# Patient Record
Sex: FEMALE | Race: OTHER | Employment: UNEMPLOYED | URBAN - METROPOLITAN AREA
[De-identification: names, ages, dates, MRNs, and addresses within clinical notes are randomized per-mention and may not be internally consistent; named-entity substitution may affect disease eponyms.]

---

## 2018-10-22 LAB
ANTIBODY SCREEN, EXTERNAL: POSITIVE
HBSAG, EXTERNAL: NEGATIVE
HIV, EXTERNAL: NEGATIVE
RPR, EXTERNAL: NORMAL
RUBELLA, EXTERNAL: NORMAL
TYPE, ABO & RH, EXTERNAL: NORMAL

## 2019-01-25 LAB — ANTIBODY SCREEN, EXTERNAL: POSITIVE

## 2019-02-27 ENCOUNTER — TELEPHONE (OUTPATIENT)
Dept: OBGYN CLINIC | Age: 24
End: 2019-02-27

## 2019-02-27 NOTE — TELEPHONE ENCOUNTER
Spoke with patient's . Has appointment tmrw at 1:40 pm.  She is a transfer pt from [facility] and we have not received records. Encouraged patient's  to please obtain records asap. He verbalized understanding.

## 2019-03-22 LAB — GRBS, EXTERNAL: POSITIVE

## 2019-04-06 ENCOUNTER — HOSPITAL ENCOUNTER (INPATIENT)
Age: 24
LOS: 3 days | Discharge: HOME | DRG: 560 | End: 2019-04-09
Attending: OBSTETRICS & GYNECOLOGY | Admitting: OBSTETRICS & GYNECOLOGY
Payer: COMMERCIAL

## 2019-04-06 DIAGNOSIS — R52 POSTPARTUM PAIN: Primary | ICD-10-CM

## 2019-04-06 PROBLEM — Z37.9 NORMAL LABOR: Status: ACTIVE | Noted: 2019-04-06

## 2019-04-06 PROCEDURE — 99218 HC RM OBSERVATION: CPT

## 2019-04-06 PROCEDURE — 99285 EMERGENCY DEPT VISIT HI MDM: CPT

## 2019-04-06 PROCEDURE — 65270000029 HC RM PRIVATE

## 2019-04-06 RX ORDER — FAMOTIDINE 10 MG/1
10 TABLET ORAL 2 TIMES DAILY
COMMUNITY
End: 2019-04-09

## 2019-04-06 RX ORDER — FENTANYL CITRATE 50 UG/ML
100 INJECTION, SOLUTION INTRAMUSCULAR; INTRAVENOUS
Status: DISCONTINUED | OUTPATIENT
Start: 2019-04-06 | End: 2019-04-07 | Stop reason: HOSPADM

## 2019-04-06 RX ORDER — SODIUM CHLORIDE 9 MG/ML
250 INJECTION, SOLUTION INTRAVENOUS AS NEEDED
Status: DISCONTINUED | OUTPATIENT
Start: 2019-04-06 | End: 2019-04-09 | Stop reason: HOSPADM

## 2019-04-06 RX ORDER — CALCIUM CARBONATE 200(500)MG
400 TABLET,CHEWABLE ORAL
Status: DISCONTINUED | OUTPATIENT
Start: 2019-04-06 | End: 2019-04-09 | Stop reason: HOSPADM

## 2019-04-06 RX ORDER — NALBUPHINE HYDROCHLORIDE 10 MG/ML
10 INJECTION, SOLUTION INTRAMUSCULAR; INTRAVENOUS; SUBCUTANEOUS
Status: DISCONTINUED | OUTPATIENT
Start: 2019-04-06 | End: 2019-04-07 | Stop reason: HOSPADM

## 2019-04-06 RX ORDER — TERBUTALINE SULFATE 1 MG/ML
0.25 INJECTION SUBCUTANEOUS AS NEEDED
Status: DISCONTINUED | OUTPATIENT
Start: 2019-04-06 | End: 2019-04-07 | Stop reason: HOSPADM

## 2019-04-06 NOTE — PROGRESS NOTES
1925 - Pt to LDR Rm 4 for complaint of contractions/abdominal pains since about 0100, but closer and more regular (approx every 5-10 min) since 0700. She noted some bloody mucous (vaginal). Accompanied by  (Dolores Mendenhall). 1936 - Bedside and Verbal shift change report given to SARA Loya RNC (oncoming nurse) by Ana Ramos RN (offgoing nurse). Report included the following information SBAR and Kardex. 1946 - Notified VAMSI Mcgarry Riser of pt's arrival and history. 1950 - R. Malgorzata Riser at bedside to assess pt. SVE 1/100/-1 BOW intact. Her plan is to reassess pt between 2300-MN and she encouraged pt to rest, if able, in the meantime. Pt also aware that she may ambulate in halls or use the shower for relaxation. 2351 - R. Malgorzata Riser at bedside to assess pt. SVE 2/100/-1. Pt will be admitted and desires pain medicine (IV). 
 
0610 - SVE 4-5/100/-1/BBOW 
 
2864 - R. Malgorzata Riser updated on recent SVE and SROM at 9324. 
 
9303 - Dr Mimi Sorenson at bedside to place epidural. 
 
2512 - Bedside and Verbal shift change report given to NAOMI Rosales RN (oncoming nurse) by Concepción Landa RNC (offgoing nurse). Report included the following information SBAR, Kardex, Procedure Summary, Intake/Output, MAR, Accordion, Recent Results and Med Rec Status.

## 2019-04-06 NOTE — H&P
Labor and Delivery Admission Note 2019 Patient is a 26 y.o.  at 39w0d dated by [address]. Pt of Dr. Sera Nick who presents with c/o Ucs since last night that became stronger at 0700. Denies sig med/ob Hx. GBS+ PNC: Blood type: B 
          RH: pos Hep B: Negative Rubella: immune GBS status: positive OBHX:  
OB History Mary Ing 1 Para Term  AB Living SAB  
   
 TAB Ectopic Molar Multiple Live Births PMH: No past medical history on file. PSH: No past surgical history on file. OB/GYN: G3 Meds:  
Prior to Admission Medications Prescriptions Last Dose Informant Patient Reported? Taking?  
famotidine (PEPCID) 10 mg tablet 2019 at Unknown time  Yes Yes Sig: Take 10 mg by mouth two (2) times a day. Facility-Administered Medications: None Allergies: No Known Allergies Pertinent ROS: Review of Systems - History obtained from chart review and the patient General ROS: negative Psychological ROS: negative Respiratory ROS: no cough, shortness of breath, or wheezing Cardiovascular ROS: no chest pain or dyspnea on exertion Genito-Urinary ROS: no dysuria, trouble voiding, or hematuria Neurological ROS: negative [address]: No family history on file. SH: Social History Socioeconomic History  Marital status: Not on file Spouse name: Not on file  Number of children: Not on file  Years of education: Not on file  Highest education level: Not on file Occupational History  Not on file Social Needs  Financial resource strain: Not on file  Food insecurity:  
  Worry: Not on file Inability: Not on file  Transportation needs:  
  Medical: Not on file Non-medical: Not on file Tobacco Use  Smoking status: Never Smoker  Smokeless tobacco: Never Used Substance and Sexual Activity  Alcohol use: Never Frequency: Never  Drug use: Not on file  Sexual activity: Not on file Lifestyle  Physical activity:  
  Days per week: Not on file Minutes per session: Not on file  Stress: Not on file Relationships  Social connections:  
  Talks on phone: Not on file Gets together: Not on file Attends Sikh service: Not on file Active member of club or organization: Not on file Attends meetings of clubs or organizations: Not on file Relationship status: Not on file  Intimate partner violence:  
  Fear of current or ex partner: Not on file Emotionally abused: Not on file Physically abused: Not on file Forced sexual activity: Not on file Other Topics Concern [address] Service Not Asked  Blood Transfusions Not Asked  Caffeine Concern Not Asked  Occupational Exposure Not Asked [employer] Hazards Not Asked  Sleep Concern Not Asked  Stress Concern Not Asked  Weight Concern Not Asked  Special Diet Not Asked  Back Care Not Asked  Exercise Not Asked  Bike Helmet Not Asked  Seat Belt Not Asked  Self-Exams Not Asked Social History Narrative  Not on file OBJECTIVE: 
 
Temp (24hrs), Av.5 °F (36.9 °C), Min:98.5 °F (36.9 °C), Max:98.5 °F (36.9 °C) Visit Vitals /76 Pulse 80 Temp 98.5 °F (36.9 °C) Ht 5' 4\" (1.626 m) Wt 69.9 kg (154 lb) BMI 26.43 kg/m² FHR baseline 145 moderate variability, +accels, n o decels [place] 7 min, moderate intensity Exam: 
General: alert HEENT:  normal  
Lungs:  clear Cor:  RRR Abdomen:  Soft, non-tender Clinical EFW Cervix:  1/100/-1 Fluid: intact Pelvimetry:  AP-good Arch- adequate Sidewalls- adequate Pelvis feels adequate for fetus. Extremities:  normal, no edema Impression:  
at 39w0d Reassuring FWB Early labor GBS+ Plan: Monitor for a few hours, discussed options of going home and returning with closer UCs vs staying and reassessing a in a few hours Pt desires to stay and see if she makes cervical change Emely Pickard CNM 
7:58 PM

## 2019-04-07 ENCOUNTER — ANESTHESIA EVENT (OUTPATIENT)
Dept: LABOR AND DELIVERY | Age: 24
DRG: 560 | End: 2019-04-07
Payer: COMMERCIAL

## 2019-04-07 ENCOUNTER — ANESTHESIA (OUTPATIENT)
Dept: LABOR AND DELIVERY | Age: 24
DRG: 560 | End: 2019-04-07
Payer: COMMERCIAL

## 2019-04-07 LAB
ERYTHROCYTE [DISTWIDTH] IN BLOOD BY AUTOMATED COUNT: 12.2 % (ref 11.5–14.5)
HCT VFR BLD AUTO: 36.2 % (ref 35–47)
HGB BLD-MCNC: 11.8 G/DL (ref 11.5–16)
MCH RBC QN AUTO: 28.7 PG (ref 26–34)
MCHC RBC AUTO-ENTMCNC: 32.6 G/DL (ref 30–36.5)
MCV RBC AUTO: 88.1 FL (ref 80–99)
NRBC # BLD: 0 K/UL (ref 0–0.01)
NRBC BLD-RTO: 0 PER 100 WBC
PLATELET # BLD AUTO: 228 K/UL (ref 150–400)
PMV BLD AUTO: 10.4 FL (ref 8.9–12.9)
RBC # BLD AUTO: 4.11 M/UL (ref 3.8–5.2)
WBC # BLD AUTO: 10.5 K/UL (ref 3.6–11)

## 2019-04-07 PROCEDURE — 86870 RBC ANTIBODY IDENTIFICATION: CPT

## 2019-04-07 PROCEDURE — 74011000250 HC RX REV CODE- 250

## 2019-04-07 PROCEDURE — 3E0R3BZ INTRODUCTION OF ANESTHETIC AGENT INTO SPINAL CANAL, PERCUTANEOUS APPROACH: ICD-10-PCS | Performed by: ANESTHESIOLOGY

## 2019-04-07 PROCEDURE — 77030011943

## 2019-04-07 PROCEDURE — 85027 COMPLETE CBC AUTOMATED: CPT

## 2019-04-07 PROCEDURE — 86900 BLOOD TYPING SEROLOGIC ABO: CPT

## 2019-04-07 PROCEDURE — 86921 COMPATIBILITY TEST INCUBATE: CPT

## 2019-04-07 PROCEDURE — 74011000258 HC RX REV CODE- 258: Performed by: ADVANCED PRACTICE MIDWIFE

## 2019-04-07 PROCEDURE — 74011250636 HC RX REV CODE- 250/636: Performed by: ADVANCED PRACTICE MIDWIFE

## 2019-04-07 PROCEDURE — 65410000002 HC RM PRIVATE OB

## 2019-04-07 PROCEDURE — A4300 CATH IMPL VASC ACCESS PORTAL: HCPCS

## 2019-04-07 PROCEDURE — 36415 COLL VENOUS BLD VENIPUNCTURE: CPT

## 2019-04-07 PROCEDURE — 74011250637 HC RX REV CODE- 250/637: Performed by: ADVANCED PRACTICE MIDWIFE

## 2019-04-07 PROCEDURE — 86922 COMPATIBILITY TEST ANTIGLOB: CPT

## 2019-04-07 PROCEDURE — 74011250636 HC RX REV CODE- 250/636

## 2019-04-07 PROCEDURE — 86920 COMPATIBILITY TEST SPIN: CPT

## 2019-04-07 RX ORDER — IBUPROFEN 400 MG/1
800 TABLET ORAL EVERY 8 HOURS
Status: DISCONTINUED | OUTPATIENT
Start: 2019-04-07 | End: 2019-04-09 | Stop reason: HOSPADM

## 2019-04-07 RX ORDER — SIMETHICONE 80 MG
80 TABLET,CHEWABLE ORAL
Status: DISCONTINUED | OUTPATIENT
Start: 2019-04-07 | End: 2019-04-09 | Stop reason: HOSPADM

## 2019-04-07 RX ORDER — ACETAMINOPHEN 325 MG/1
650 TABLET ORAL
Status: DISCONTINUED | OUTPATIENT
Start: 2019-04-07 | End: 2019-04-09 | Stop reason: HOSPADM

## 2019-04-07 RX ORDER — FENTANYL/BUPIVACAINE/NS/PF 2-1250MCG
PREFILLED PUMP RESERVOIR EPIDURAL
Status: COMPLETED
Start: 2019-04-07 | End: 2019-04-07

## 2019-04-07 RX ORDER — BUPIVACAINE HYDROCHLORIDE 5 MG/ML
30 INJECTION, SOLUTION EPIDURAL; INTRACAUDAL AS NEEDED
Status: DISCONTINUED | OUTPATIENT
Start: 2019-04-07 | End: 2019-04-07 | Stop reason: HOSPADM

## 2019-04-07 RX ORDER — NALOXONE HYDROCHLORIDE 0.4 MG/ML
0.4 INJECTION, SOLUTION INTRAMUSCULAR; INTRAVENOUS; SUBCUTANEOUS AS NEEDED
Status: DISCONTINUED | OUTPATIENT
Start: 2019-04-07 | End: 2019-04-07 | Stop reason: SDUPTHER

## 2019-04-07 RX ORDER — NALOXONE HYDROCHLORIDE 0.4 MG/ML
0.4 INJECTION, SOLUTION INTRAMUSCULAR; INTRAVENOUS; SUBCUTANEOUS AS NEEDED
Status: DISCONTINUED | OUTPATIENT
Start: 2019-04-07 | End: 2019-04-09 | Stop reason: HOSPADM

## 2019-04-07 RX ORDER — EPHEDRINE SULFATE/0.9% NACL/PF 50 MG/5 ML
SYRINGE (ML) INTRAVENOUS
Status: DISCONTINUED
Start: 2019-04-07 | End: 2019-04-07 | Stop reason: WASHOUT

## 2019-04-07 RX ORDER — SODIUM CHLORIDE, SODIUM LACTATE, POTASSIUM CHLORIDE, CALCIUM CHLORIDE 600; 310; 30; 20 MG/100ML; MG/100ML; MG/100ML; MG/100ML
125 INJECTION, SOLUTION INTRAVENOUS CONTINUOUS
Status: DISCONTINUED | OUTPATIENT
Start: 2019-04-07 | End: 2019-04-09 | Stop reason: HOSPADM

## 2019-04-07 RX ORDER — HYDROCODONE BITARTRATE AND ACETAMINOPHEN 5; 325 MG/1; MG/1
1 TABLET ORAL
Status: DISCONTINUED | OUTPATIENT
Start: 2019-04-07 | End: 2019-04-09 | Stop reason: HOSPADM

## 2019-04-07 RX ORDER — SODIUM CHLORIDE 0.9 % (FLUSH) 0.9 %
5-40 SYRINGE (ML) INJECTION AS NEEDED
Status: DISCONTINUED | OUTPATIENT
Start: 2019-04-07 | End: 2019-04-09 | Stop reason: HOSPADM

## 2019-04-07 RX ORDER — BUPIVACAINE HYDROCHLORIDE 5 MG/ML
INJECTION, SOLUTION EPIDURAL; INTRACAUDAL
Status: COMPLETED
Start: 2019-04-07 | End: 2019-04-07

## 2019-04-07 RX ORDER — OXYTOCIN/RINGER'S LACTATE 20/1000 ML
125-500 PLASTIC BAG, INJECTION (ML) INTRAVENOUS ONCE
Status: ACTIVE | OUTPATIENT
Start: 2019-04-07 | End: 2019-04-08

## 2019-04-07 RX ORDER — FENTANYL CITRATE 50 UG/ML
100 INJECTION, SOLUTION INTRAMUSCULAR; INTRAVENOUS ONCE
Status: COMPLETED | OUTPATIENT
Start: 2019-04-07 | End: 2019-04-07

## 2019-04-07 RX ORDER — OXYTOCIN/0.9 % SODIUM CHLORIDE 30/500 ML
PLASTIC BAG, INJECTION (ML) INTRAVENOUS
Status: COMPLETED
Start: 2019-04-07 | End: 2019-04-07

## 2019-04-07 RX ORDER — BUPIVACAINE HYDROCHLORIDE 5 MG/ML
INJECTION, SOLUTION EPIDURAL; INTRACAUDAL
Status: COMPLETED | OUTPATIENT
Start: 2019-04-07 | End: 2019-04-07

## 2019-04-07 RX ORDER — FENTANYL/BUPIVACAINE/NS/PF 2-1250MCG
1-16 PREFILLED PUMP RESERVOIR EPIDURAL CONTINUOUS
Status: DISCONTINUED | OUTPATIENT
Start: 2019-04-07 | End: 2019-04-09 | Stop reason: HOSPADM

## 2019-04-07 RX ORDER — FAMOTIDINE 20 MG/1
10 TABLET, FILM COATED ORAL 2 TIMES DAILY
Status: DISCONTINUED | OUTPATIENT
Start: 2019-04-07 | End: 2019-04-09 | Stop reason: HOSPADM

## 2019-04-07 RX ORDER — CEFAZOLIN SODIUM/WATER 2 G/20 ML
2 SYRINGE (ML) INTRAVENOUS ONCE
Status: COMPLETED | OUTPATIENT
Start: 2019-04-07 | End: 2019-04-07

## 2019-04-07 RX ORDER — FENTANYL CITRATE 50 UG/ML
INJECTION, SOLUTION INTRAMUSCULAR; INTRAVENOUS
Status: COMPLETED
Start: 2019-04-07 | End: 2019-04-07

## 2019-04-07 RX ORDER — HYDROCORTISONE ACETATE PRAMOXINE HCL 2.5; 1 G/100G; G/100G
CREAM TOPICAL AS NEEDED
Status: DISCONTINUED | OUTPATIENT
Start: 2019-04-07 | End: 2019-04-09 | Stop reason: HOSPADM

## 2019-04-07 RX ORDER — FENTANYL CITRATE 50 UG/ML
INJECTION, SOLUTION INTRAMUSCULAR; INTRAVENOUS AS NEEDED
Status: DISCONTINUED | OUTPATIENT
Start: 2019-04-07 | End: 2019-04-07 | Stop reason: HOSPADM

## 2019-04-07 RX ADMIN — AMPICILLIN SODIUM 2 G: 2 INJECTION, POWDER, FOR SOLUTION INTRAMUSCULAR; INTRAVENOUS at 04:33

## 2019-04-07 RX ADMIN — FENTANYL CITRATE 100 MCG: 50 INJECTION, SOLUTION INTRAMUSCULAR; INTRAVENOUS at 06:57

## 2019-04-07 RX ADMIN — OXYTOCIN-SODIUM CHLORIDE 0.9% IV SOLN 30 UNIT/500ML 30000 MILLI-UNITS: 30-0.9/5 SOLUTION at 12:50

## 2019-04-07 RX ADMIN — PROMETHAZINE HYDROCHLORIDE 25 MG: 25 INJECTION INTRAMUSCULAR; INTRAVENOUS at 00:41

## 2019-04-07 RX ADMIN — Medication 10 ML/HR: at 07:04

## 2019-04-07 RX ADMIN — SODIUM CHLORIDE, SODIUM LACTATE, POTASSIUM CHLORIDE, AND CALCIUM CHLORIDE 125 ML/HR: 600; 310; 30; 20 INJECTION, SOLUTION INTRAVENOUS at 09:07

## 2019-04-07 RX ADMIN — NALBUPHINE HYDROCHLORIDE 10 MG: 10 INJECTION, SOLUTION INTRAMUSCULAR; INTRAVENOUS; SUBCUTANEOUS at 04:21

## 2019-04-07 RX ADMIN — SODIUM CHLORIDE, SODIUM LACTATE, POTASSIUM CHLORIDE, AND CALCIUM CHLORIDE 125 ML/HR: 600; 310; 30; 20 INJECTION, SOLUTION INTRAVENOUS at 06:40

## 2019-04-07 RX ADMIN — BUPIVACAINE HYDROCHLORIDE 5 MG: 5 INJECTION, SOLUTION EPIDURAL; INTRACAUDAL at 06:57

## 2019-04-07 RX ADMIN — FENTANYL CITRATE 100 MCG: 50 INJECTION, SOLUTION INTRAMUSCULAR; INTRAVENOUS at 06:59

## 2019-04-07 RX ADMIN — NALBUPHINE HYDROCHLORIDE 10 MG: 10 INJECTION, SOLUTION INTRAMUSCULAR; INTRAVENOUS; SUBCUTANEOUS at 00:43

## 2019-04-07 RX ADMIN — CALCIUM CARBONATE (ANTACID) CHEW TAB 500 MG 400 MG: 500 CHEW TAB at 04:33

## 2019-04-07 RX ADMIN — Medication 2 G: at 13:42

## 2019-04-07 RX ADMIN — SODIUM CHLORIDE, SODIUM LACTATE, POTASSIUM CHLORIDE, AND CALCIUM CHLORIDE 1000 ML: 600; 310; 30; 20 INJECTION, SOLUTION INTRAVENOUS at 06:10

## 2019-04-07 RX ADMIN — SODIUM CHLORIDE, SODIUM LACTATE, POTASSIUM CHLORIDE, AND CALCIUM CHLORIDE 125 ML/HR: 600; 310; 30; 20 INJECTION, SOLUTION INTRAVENOUS at 00:30

## 2019-04-07 RX ADMIN — AMPICILLIN 1 G: 1 INJECTION, POWDER, FOR SOLUTION INTRAMUSCULAR; INTRAVENOUS at 09:04

## 2019-04-07 RX ADMIN — ACETAMINOPHEN 650 MG: 325 TABLET ORAL at 21:59

## 2019-04-07 RX ADMIN — IBUPROFEN 800 MG: 400 TABLET ORAL at 15:51

## 2019-04-07 NOTE — ANESTHESIA POSTPROCEDURE EVALUATION
Post-Anesthesia Evaluation and Assessment Patient: Renu Ronquillo MRN: [id number]  SSN: [id number] YOB: 1995  Age: 26 y.o. Sex: female I have evaluated the patient and they are stable and ready for discharge from the PACU. Cardiovascular Function/Vital Signs Visit Vitals /77 Pulse 86 Temp 37.7 °C (99.8 °F) Resp 16 Ht 5' 4\" (1.626 m) Wt 69.9 kg (154 lb) SpO2 97% Breastfeeding? Unknown BMI 26.43 kg/m² Patient is status post * No anesthesia type entered * anesthesia for * No procedures listed *. Nausea/Vomiting: None Postoperative hydration reviewed and adequate. Pain: 
Pain Scale 1: Numeric (0 - 10) (04/07/19 1313) Pain Intensity 1: 0 (04/07/19 1313) Managed Neurological Status:  
Neuro (WDL): Within Defined Limits (04/07/19 0801) At baseline Mental Status, Level of Consciousness: Alert and  oriented to person, place, and time Pulmonary Status:  
O2 Device: Room air (04/07/19 0801) Adequate oxygenation and airway patent Complications related to anesthesia: None Post-anesthesia assessment completed. No concerns Signed By: Sharif Salamanca MD   
 April 7, 2019 * No procedures listed *. 
 
epidural 
 
<BSHSIANPOST> Vitals Value Taken Time /60 4/7/2019  2:38 PM  
Temp Pulse 99 4/7/2019  2:38 PM  
Resp SpO2 Vitals shown include unvalidated device data.

## 2019-04-07 NOTE — ANESTHESIA PREPROCEDURE EVALUATION
Relevant Problems No relevant active problems Anesthetic History No history of anesthetic complications Review of Systems / Medical History Patient summary reviewed, nursing notes reviewed and pertinent labs reviewed Pulmonary Within defined limits Neuro/Psych Within defined limits Cardiovascular Within defined limits GI/Hepatic/Renal 
Within defined limits Endo/Other Within defined limits Other Findings Physical Exam 
 
Airway Mallampati: II 
TM Distance: > 6 cm Neck ROM: normal range of motion Mouth opening: Normal 
 
 Cardiovascular Regular rate and rhythm,  S1 and S2 normal,  no murmur, click, rub, or gallop Dental 
No notable dental hx Pulmonary Breath sounds clear to auscultation Abdominal 
GI exam deferred Other Findings Anesthetic Plan ASA: 2 Anesthesia type: epidural 
 
 
 
 
 
Anesthetic plan and risks discussed with: Patient

## 2019-04-07 NOTE — PROGRESS NOTES
0730 Bedside and Verbal shift change report given to Pb Tellez RN (oncoming nurse) by Duran Oreilly RN (offgoing nurse). Report included the following information SBAR, Procedure Summary, Accordion and Recent Results. [address] for 500 cc. 
 
0900 Bard Gera HAMMER on call for [facility] in to see pt. Discuss POC. 
 
1147 Kelby Mar at bedside. SVE 10/100/+3. 
 
1200 Up in [facility], pushing with ucs. Kelby Mar in attendance. 18  of live male baby with short cord. 1249 Manual removal of placenta by Kelby Mar. 1300 St.Cath by Kelby Mar for 600cc. Estimated -500  Ml. 
 
1315 PP care started. 1342 Ancef 2 gm IVPB x 1 per order 1700 [facility] for 900 cc.  
 
1600 Up to w/c for transfer to [facility]. NB in [facility]. Accompanied by Sally Freire, nursing student Shaun Campuzano & spouse Claudeb. 1630 TRANSFER - OUT REPORT: 
 
Verbal report given to Nessa Eisenberg RN(name) on Jaclyn Anton  being transferred to [facility](unit) for routine progression of care Report consisted of patients Situation, Background, Assessment and  
Recommendations(SBAR). Information from the following report(s) SBAR, Procedure Summary, Accordion and Recent Results was reviewed with the receiving nurse. Lines:  
Peripheral IV 19 Anterior; Left Forearm (Active) Site Assessment Clean, dry, & intact 2019 12:44 AM  
Phlebitis Assessment 0 2019 12:44 AM  
Infiltration Assessment 0 2019 12:44 AM  
Dressing Status Clean, dry, & intact; New 2019 12:44 AM  
Dressing Type Tape;Transparent 2019 12:44 AM  
Hub Color/Line Status Pink; Infusing;Patent 2019 12:44 AM  
  
 
Opportunity for questions and clarification was provided.    
 
Patient transported with: 
 Registered Nurse 
 
[phone] with Kelby Mar, not need for CBC in am.

## 2019-04-07 NOTE — PROGRESS NOTES
[facility] Labor Progress Note Patient: Judy Stoner MRN: [id number]  SSN: [id number] YOB: 1995  Age: 26 y.o. Sex: female Care assumed Subjective:  
Patient coping well with contractions. Is now very comfortable with epidural.  
 
 
Objective:  
Blood pressure 119/67, pulse 85, temperature 98.8 °F (37.1 °C), resp. rate 12, height 5' 4\" (1.626 m), weight 69.9 kg (154 lb), not currently breastfeeding. Fetal heart baseline 125, moderate variability, positive accelerations,  negative decelerations, Uterine contractions q 5-6 minutes,  moderate to palpation, resting tone soft, Sterile Vaginal Exam: deferred, fetal presentation vertex, membranes ruptured for continued clear fluid. Patient Vitals for the past 4 hrs: 
 Pulse BP  
19 0714 85 119/67  
19 0706 87 124/70  
19 0659 82 136/73  
19 0656 84 140/71  
19 0654 79 149/80  
19 0633 78 130/85 Assessment:  
 
39w1d Category 1 fetal heart rate tracing Labor GBS Positive Rubella Immune HIV Negative Hep B Negative +Anti-chata b antibodies- 2 units on hold Plan:  
Recheck cervix approx 1200 ( approx 6 hours post last check) to ensure progress. If contractions remain spaced and/or no cervical change consider pitocin augmentation. Recheck cervix sooner with maternal or fetal indication. Anticipate BATSHEVA Mason CNM

## 2019-04-07 NOTE — PROGRESS NOTES
Labor Progress Note Patient seen, fetal heart rate and contraction pattern evaluated. Requesting epidural. FOB at bedside. Physical Exam: 
Cervical Exam: 5/100/-1 per RN Membranes:  SROM, clear fluid at 4298 Uterine Activity: Frequency: 2-5 min Fetal Heart Rate: Reactive, 130 moderate variability Accelerations: yes Decelerations: none Uterine contractions: regular Assessment/Plan: 
IUP 39.1 Reassuring FWB 
GBS+, ampicillin +Anti-chata b antibodies- 2 units on hold 
epidural 
Anticipate  Abdi Samson CNM

## 2019-04-07 NOTE — LACTATION NOTE
This note was copied from a baby's chart. Baby nursing well after delivery, deep latch obtained, mother is comfortable, baby feeding vigorously with rhythmic suck, swallow, breathe pattern,  baby is skin to skin for feeding.

## 2019-04-07 NOTE — L&D DELIVERY NOTE
CNM Delivery Note       Patient: Chasity Valerio MRN: [id number]  SSN: [id number]    YOB: 1995  Age: 26 y.o. Sex: female         of a live male infant at 18 with Apgars 8, 9 in YENNI position undr epidural anesthesia with mother in stirrups position. Shoulders spontaneous, easily delivered with maternal effort. Vigorous infant placed on maternal abdomen immediately following delivery. Placenta and membranes removed manually, trailing membranes teased out with ring forceps,  intact, with 3 vessel cord at 1249. Fundus massaged to firm. Estimated blood loss 500 mL. Vagina and perineum inspected. Perineum intact. Mother and infant stable, bonding, establishing breastfeeding. Delivery Summary    Patient: Chasity Valerio MRN: [id number]  SSN: [id number]    YOB: 1995  Age: 26 y.o. Sex: female       Information for the patient's :  Klever Oneill [[id number]]       Labor Events:    Labor: No   Rupture Date: 2019   Rupture Time: 6:23 AM   Rupture Type Bulging;SROM   Amniotic Fluid Volume:      Amniotic Fluid Description: Clear None   Induction: None       Augmentation: None   Labor Events: None     Cervical Ripening:     None     Delivery Events:  Episiotomy: None   Laceration(s): None     Repaired: None    Number of Repair Packets:     Suture Type and Size:       Estimated Blood Loss (ml):  ml       Delivery Date: 2019    Delivery Time: 12:42 PM  Delivery Type: Vaginal, Spontaneous  Sex:  Male     Gestational Age: 36w3d   Delivery Clinician:  Angela Oakes  Living Status: Living   Delivery Location: L&D            APGARS  One minute Five minutes Ten minutes   Skin color: 1   1        Heart rate: 2   2        Grimace: 2   2        Muscle tone: 1   2        Breathin   2        Totals: 8   9            Presentation: Vertex    Position: Left Occiput Anterior  Resuscitation Method:  Suctioning-bulb; Tactile Stimulation     Meconium Stained: None      Cord Information: 3 Vessels  Complications: None  Cord around:    Delayed cord clamping? Yes  Cord clamped date/time:   Disposition of Cord Blood:      Blood Gases Sent?: No    Placenta:  Date/Time: 2019 12:49 PM  Removal: Manual Removal      Appearance: Intact; Normal;Other (comment)     [place] Measurements:  Birth Weight:        Birth Length:        Head Circumference:        Chest Circumference:       Abdominal Girth: Other Providers:   Delta Tomas, Obstetrician;Primary Nurse;Primary [place] Nurse;Nicu Nurse;Neonatologist;Anesthesiologist;Crna;Nurse Practitioner;Midwife;Nursery Nurse           Group B Strep:   Lab Results   Component Value Date/Time    GrBStrep, External POSITIVE 2019     Information for the patient's :  Erich Valladares [[id number]]   No results found for: ABORH, PCTABR, PCTDIG, BILI, ABORHEXT, ABORH    No results for input(s): PCO2CB, PO2CB, HCO3I, SO2I, IBD, PTEMPI, SPECTI, PHICB, ISITE, IDEV, IALLEN in the last 72 hours.

## 2019-04-07 NOTE — ROUTINE PROCESS
TRANSFER - IN REPORT: 
 
Verbal report received from Hilario Encarnacion RN(name) on Lindaann Cooks  being received from L&D(unit) for routine progression of care Report consisted of patients Situation, Background, Assessment and  
Recommendations(SBAR). Information from the following report(s) SBAR was reviewed with the receiving nurse. Opportunity for questions and clarification was provided. Assessment completed upon patients arrival to unit and care assumed.

## 2019-04-07 NOTE — ANESTHESIA PROCEDURE NOTES
Epidural Block    Start time: 4/7/2019 6:47 AM  End time: 4/7/2019 6:58 AM  Performed by: Rufino Walker MD  Authorized by: Rufino Walker MD     Pre-Procedure  Indication: labor epidural    Preanesthetic Checklist: risks and benefits discussed and timeout performed    Timeout Time: 06:47        Epidural:   Patient position:  Left lateral decubitus  Prep region:  Lumbar  Prep: Chlorhexidine    Location:  L2-3    Needle and Epidural Catheter:   Needle Type:  Tuohy  Needle Gauge:  17 G  Injection Technique:  Loss of resistance using air  Attempts:  1  Catheter Size:  19 G  Events: no blood with aspiration, no cerebrospinal fluid with aspiration, no paresthesia and negative aspiration test    Test Dose:  Bupivacaine 0.25% and negative    Assessment:   Catheter Secured:  Tegaderm and tape  Insertion:  Uncomplicated  Patient tolerance:  Patient tolerated the procedure well with no immediate complications

## 2019-04-07 NOTE — PROGRESS NOTES
Labor Progress Note Patient seen, fetal heart rate and contraction pattern evaluated. C/o increased intensity with UCs. Requesting pain meds. Physical Exam: 
Cervical Exam: 2/100/-1 Membranes:  Intact Uterine Activity: Frequency: 4-6 min Fetal Heart Rate: Reactive, 150 moderate varaibility Accelerations: yes Decelerations: none Uterine contractions: regular Assessment/Plan: 
IUP 39 wks Reassuring FWB 
GBS+- ampicillin 
+anti-chata B antibodies- type and cross on hold for 2 units Early labor IV pain meds as desired Annamarie Iverson CNM

## 2019-04-08 PROCEDURE — 75410000002 HC LABOR FEE PER 1 HR

## 2019-04-08 PROCEDURE — 76060000078 HC EPIDURAL ANESTHESIA

## 2019-04-08 PROCEDURE — 65410000002 HC RM PRIVATE OB

## 2019-04-08 PROCEDURE — 75410000000 HC DELIVERY VAGINAL/SINGLE

## 2019-04-08 PROCEDURE — 74011250637 HC RX REV CODE- 250/637: Performed by: ADVANCED PRACTICE MIDWIFE

## 2019-04-08 PROCEDURE — 75410000003 HC RECOV DEL/VAG/CSECN EA 0.5 HR

## 2019-04-08 RX ADMIN — ACETAMINOPHEN 650 MG: 325 TABLET ORAL at 12:41

## 2019-04-08 RX ADMIN — IBUPROFEN 800 MG: 400 TABLET ORAL at 17:03

## 2019-04-08 RX ADMIN — IBUPROFEN 800 MG: 400 TABLET ORAL at 00:25

## 2019-04-08 RX ADMIN — FAMOTIDINE 10 MG: 20 TABLET ORAL at 08:48

## 2019-04-08 RX ADMIN — ACETAMINOPHEN 650 MG: 325 TABLET ORAL at 08:47

## 2019-04-08 RX ADMIN — IBUPROFEN 800 MG: 400 TABLET ORAL at 08:47

## 2019-04-08 RX ADMIN — ACETAMINOPHEN 650 MG: 325 TABLET ORAL at 21:00

## 2019-04-08 NOTE — PROGRESS NOTES
Bedside and Verbal shift change report given to Blossom Natarajan rn (oncoming nurse) by CHRISTIAN Subramanian rn (offgoing nurse). Report included the following information SBAR.

## 2019-04-08 NOTE — PROGRESS NOTES
Progress Note Patient: Alan Biggs MRN: [id number]  SSN: [id number] YOB: 1995  Age: 26 y.o. Sex: female Postpartum Day Number 1 Subjective:  
 
Patient doing well postpartum without significant complaints. Voiding without difficulty. Patient reports normal lochia. . Breastfeeding: Yes Objective:  
 
Patient Vitals for the past 18 hrs: 
 Temp Pulse Resp BP  
19 0500 97.5 °F (36.4 °C) 73 16 104/70  
19 2100 97.9 °F (36.6 °C) 69 16 105/70  
19 1745 98.4 °F (36.9 °C) 83 16 105/66  
19 1638 98.2 °F (36.8 °C) 86 16 110/72  
19 1544 97.1 °F (36.2 °C) 95 16 118/59  
19 1508  91  119/62  
19 1453  98  117/68  
19 1438  99  113/60  
19 1423  96  107/61  
19 1408  86  119/69  
19 1353  86  119/77  
19 1338  92  121/82 Temp (24hrs), Av.7 °F (37.1 °C), Min:97.1 °F (36.2 °C), Max:102.3 °F (39.1 °C) Physical Exam:   
Patient without distress. Heart: Regular rate and rhythm Lung: clear to auscultation throughout lung fields, no wheezes, no rales, no rhonchi and normal respiratory effort Abdomen: soft, nontender Fundus: firm and non tender Lower Extremities: no calf tenderness Lab/Data Review: CBC:   
Recent Labs 19 
0050 WBC 10.5 HGB 11.8 HCT 36.2  Assessment and Plan:  
 
Patient appears to be having an uncomplicated postpartum course. Continue routine perineal care and maternal education. Boy- desires circ RI/B+ Signed By: David Maxwell MD   
 2019

## 2019-04-08 NOTE — ROUTINE PROCESS
Bedside and Verbal shift change report given to CHRISTIAN Arevalo (oncoming nurse) by Ghulam Treviño RN (offgoing nurse).  Report included the following information SBAR

## 2019-04-08 NOTE — LACTATION NOTE
This note was copied from a baby's chart. I did not see the baby at the breast. Mom states the baby has been nursing about every 2 hours for 20 minutes. She is able to get the baby latched on both breasts. She states she has no questions for lactation at this time.

## 2019-04-09 VITALS
HEIGHT: 64 IN | OXYGEN SATURATION: 97 % | TEMPERATURE: 97.7 F | SYSTOLIC BLOOD PRESSURE: 134 MMHG | RESPIRATION RATE: 16 BRPM | DIASTOLIC BLOOD PRESSURE: 80 MMHG | BODY MASS INDEX: 26.29 KG/M2 | HEART RATE: 82 BPM | WEIGHT: 154 LBS

## 2019-04-09 PROCEDURE — 77030036554

## 2019-04-09 PROCEDURE — 74011250637 HC RX REV CODE- 250/637: Performed by: ADVANCED PRACTICE MIDWIFE

## 2019-04-09 RX ORDER — IBUPROFEN 800 MG/1
800 TABLET ORAL EVERY 8 HOURS
Qty: 30 TAB | Refills: 0 | Status: ON HOLD | OUTPATIENT
Start: 2019-04-09 | End: 2021-11-18 | Stop reason: CLARIF

## 2019-04-09 RX ORDER — HYDROCODONE BITARTRATE AND ACETAMINOPHEN 5; 325 MG/1; MG/1
1 TABLET ORAL
Qty: 3 TAB | Refills: 0 | Status: SHIPPED | OUTPATIENT
Start: 2019-04-09 | End: 2019-04-12

## 2019-04-09 RX ADMIN — IBUPROFEN 800 MG: 400 TABLET ORAL at 01:05

## 2019-04-09 RX ADMIN — CALCIUM CARBONATE (ANTACID) CHEW TAB 500 MG 400 MG: 500 CHEW TAB at 08:40

## 2019-04-09 RX ADMIN — IBUPROFEN 800 MG: 400 TABLET ORAL at 08:40

## 2019-04-09 RX ADMIN — HYDROCODONE BITARTRATE AND ACETAMINOPHEN 1 TABLET: 5; 325 TABLET ORAL at 01:13

## 2019-04-09 NOTE — ROUTINE PROCESS
Bedside shift change report given to Nik Guzman RN (oncoming nurse) by Hema Segovia RN (offgoing nurse). Report included the following information SBAR, Kardex, Procedure Summary, Intake/Output, MAR and Recent Results.

## 2019-04-09 NOTE — LACTATION NOTE
This note was copied from a baby's chart. Infant seen at breast at this visit. Deep latch noted, rhythmic sucking and swallowing noted. Mom has cracks at the base of the left nipple. Order requested from Dr. Isabel Cuevas for nipple treatment. Infant voiding and stooling adequately. Breasts may become engorged when milk \"comes in\". How milk is made / normal phases of milk production, supply and demand discussed. Taught care of engorged breasts - frequent breastfeeding encouraged, warm compresses and breast massage ac. Then nurse the baby or pump. Apply cold compresses pc x 15 minutes a few times a day for swelling or discomfort. May need to do this care for a couple of days. Discussed prevention and treatment of mastitis.

## 2019-04-09 NOTE — PROGRESS NOTES
Post-Partum Day Number 2 Progress/Discharge Note Patient doing well post-partum without significant complaint. Voiding without difficulty, normal lochia. Denies cp/sob/n/v. Ambulating without problem. Vitals:   
Patient Vitals for the past 8 hrs: 
 BP Temp Pulse Resp  
19 0120 105/64 97.8 °F (36.6 °C) 74 16 Temp (24hrs), Av.5 °F (36.4 °C), Min:97.4 °F (36.3 °C), Max:97.8 °F (36.6 °C) Vital signs stable, afebrile. Exam:  Patient without distress. Heart regular rate and rhythm Lungs CTA b/l Abdomen soft, fundus firm at level of umbilicus, non tender Lower extremities are negative for swelling, cords or tenderness. Lab/Data Review: 
no new labs Assessment and Plan:  Patient appears to be having uncomplicated post-partum course. Continue routine perineal care and maternal education. Plan discharge for today with follow up in our office in 2 weeks. Boy-circ today. Benign labs.

## 2019-04-09 NOTE — PROGRESS NOTES
Bedside shift change report given to Radha Sherwood Rn  (oncoming nurse) by Jay Jay Villavicencio  (offgoing nurse). Report included the following information SBAR.

## 2019-04-09 NOTE — DISCHARGE SUMMARY
Obstetrical Discharge Summary     Name: Marie Coon MRN: [id number]  SSN: [id number]    YOB: 1995  Age: 26 y.o. Sex: female      Allergies: Patient has no known allergies. Admit Date: 2019    Discharge Date: 2019     Admitting Physician: Chase Webb MD     Attending Physician:  Anahi Larsen MD     * Admission Diagnoses: Normal labor [O80, Z37.9]  Normal labor [O80, Z37.9]    * Discharge Diagnoses:   Information for the patient's :  Michelle Barrientos [[id number]]   Delivery of a 3.195 kg male infant via Vaginal, Spontaneous on 2019 at 12:42 PM  by . Apgars were 8 and 9. Additional Diagnoses:   Hospital Problems as of 2019 Never Reviewed          Codes Class Noted - Resolved POA    Normal labor ICD-10-CM: O80, Z37.9  ICD-9-CM: 265  2019 - Present Unknown             Lab Results   Component Value Date/Time    ABO/Rh(D) B POSITIVE 2019 12:50 AM    Rubella, External IMMUNE 10/22/2018    GrBStrep, External POSITIVE 2019    ABO,Rh B POS 10/22/2018      There is no immunization history on file for this patient.     * Procedures:  boy 19  * No surgery found *      [place]  Depression Scale  I have been able to laugh and see the funny side of things: As much as I always could  I have looked forward with enjoyment to things: Rather less than I used to  I have blamed myself unnecessarily when things went wrong: No, never  I have been anxious or worried for no good reason: Hardly ever  I have felt scared or panicky for no very good reason: No, not much  Things have been getting on top of me: No, most of the time I have coped quite well  I have been so unhappy that I have had difficulty sleeping: Yes, sometimes  I have felt sad or miserable: Not very often  I have been so unhappy that I have been crying: Yes, quite often  The thought of harming myself has occurred to me: Never  Total Score: 9    * Discharge Condition: [place] [facility] Course: Normal hospital course following the delivery. * Disposition: Home    Discharge Medications:   Current Discharge Medication List      START taking these medications    Details   HYDROcodone-acetaminophen (NORCO) 5-325 mg per tablet Take 1 Tab by mouth every six (6) hours as needed for Pain for up to 3 days. Max Daily Amount: 4 Tabs. Qty: 3 Tab, Refills: 0    Associated Diagnoses: Postpartum pain      ibuprofen (MOTRIN) 800 mg tablet Take 1 Tab by mouth every eight (8) hours. Qty: 30 Tab, Refills: 0    Associated Diagnoses: Postpartum pain         STOP taking these medications       famotidine (PEPCID) 10 mg tablet Comments:   Reason for Stopping:               * Follow-up Care/Patient Instructions:   Activity: no sex, douching, tampons, bath/pool x 6 weeks  Diet: Regular Diet  Wound Care: None needed    Follow-up Information     Follow up With Specialties Details Why Contact Info    Postpartum Support Group    Meets 1st And 3rd Saturday Each Month From 9:00-10:30  [id number]  ([address])  [place] [id number]      Breastfeeding Support Group Lactation Services   Meets 1st And 3rd Tuesday Each Month From 10:00-11:00  [id number]  ([address])  [place] [id number]    None    None (395) Patient stated that they have no PCP      Aubrey Owens MD Obstetrics & Gynecology In 2 weeks  [address] [facility] [address] [place] [address]  [phone]             Signed By:  Raffi Tinajero DO     April 9, 2019

## 2019-04-09 NOTE — ROUTINE PROCESS
Bedside and Verbal shift change report given to Siomara Kapadia (oncoming nurse) by Karl Romero (offgoing nurse). Report included the following information SBAR.

## 2019-04-09 NOTE — DISCHARGE INSTRUCTIONS
POSTPARTUM DISCHARGE INSTRUCTIONS       Name:  Martir Del Valle  YOB: 1995  Admission Diagnosis:  Normal labor [O80, Z37.9]  Normal labor [O80, Z37.9]     Discharge Diagnosis:    Problem List as of 4/9/2019 Never Reviewed          Codes Class Noted - Resolved    Normal labor ICD-10-CM: O80, Z37.9  ICD-9-CM: 481  4/6/2019 - Present            Attending Physician:  Darian Potts MD    Delivery Type:  Vaginal Childbirth: What To Expect At Home    Your Recovery: Your body will slowly heal in the next few weeks. It is easy to get too tired and overwhelmed during the first weeks after your baby is born. Changes in your hormones can shift your mood without warning. You may find it hard to meet the extra demands on your energy and time. Take it easy on yourself. Follow-up care is a key part of your treatment and safety. Be sure to make and go to all appointments, and call your doctor if you are having problems. It's also a good idea to know your test results and keep a list of the medicines you take. How can you care for yourself at home? Vaginal bleeding and cramps  · After delivery, you will have a bloody discharge from the vagina. This will turn pink within a week and then white or yellow after about 10 days. It may last for 2 to 4 weeks or longer, until the uterus has healed. Use pads instead of tampons until you stop bleeding. · Do not worry if you pass some blood clots, as long as they are smaller than a golf ball. If you have a tear or stitches in your vaginal area, change the pad at least every 4 hours to prevent soreness and infection. · You may have cramps for the first few days after childbirth. These are normal and occur as the uterus shrinks to normal size. Take an over-the-counter pain medicine, such as acetaminophen (Tylenol), ibuprofen (Advil, Motrin), or naproxen (Aleve), for cramps. Read and follow all instructions on the label.  Do not take aspirin, because it can cause more bleeding. Do not take acetaminophen (Tylenol) and other acetaminophen containing medications (i.e. Percocet) at the same time. Breast fullness  · Your breasts may overfill (engorge) in the first few days after delivery. To help milk flow and to relieve pain, warm your breasts in the shower or by using warm, moist towels before nursing. · If you are not nursing, do not put warmth on your breasts or touch your breasts. Wear a tight bra or sports bra and use ice until the fullness goes away. This usually takes 2 to 3 days. · Put ice or a cold pack on your breast after nursing to reduce swelling and pain. Put a thin cloth between the ice and your skin. Activity  · Eat a balanced diet. Do not try to lose weight by cutting calories. Keep taking your prenatal vitamins, or take a multivitamin. · Get as much rest as you can. Try to take naps when your baby sleeps during the day. · Get some exercise every day. But do not do any heavy exercise until your doctor says it is okay. · Wait until you are healed (about 4 to 6 weeks) before you have sexual intercourse. Your doctor will tell you when it is okay to have sex. · Talk to your doctor about birth control. You can get pregnant even before your period returns. Also, you can get pregnant while you are breast-feeding. Mental Health  · Many women get the \"baby blues\" during the first few days after childbirth. You may lose sleep, feel irritable, and cry easily. You may feel happy one minute and sad the next. Hormone changes are one cause of these emotional changes. Also, the demands of a new baby, along with visits from relatives or other family needs, add to a mother's stress. The \"baby blues\" often peak around the fourth day. Then they ease up in less than 2 weeks. · If your moodiness or anxiety lasts for more than 2 weeks, or if you feel like life is not worth living, you may have postpartum depression. This is different for each mother.  Some mothers with serious depression may worry intensely about their infant's well-being. Others may feel distant from their child. Some mothers might even feel that they might harm their baby. A mother may have signs of paranoia, wondering if someone is watching her. · With all the changes in your life, you may not know if you are depressed. Pregnancy sometimes causes changes in how you feel that are similar to the symptoms of depression. · Symptoms of depression include:  · Feeling sad or hopeless and losing interest in daily activities. These are the most common symptoms of depression. · Sleeping too much or not enough. · Feeling tired. You may feel as if you have no energy. · Eating too much or too little. · POSTPART[employer] (PSI) offers a Warm line; Chat with the Expert phone sessions; Information and Articles about Pregnancy and Postpartum Mood Disorders; Comprehensive List of Free Support Groups; Knowledgeable local coordinators who will offer support, information, and resources; Guide to Resources on [employer]; Calendar of events in the  mood disorders community; Latest News and Research; and [facility] [address] for [employer]. Remember - You are not alone; You are not to blame; With help, you will be well. 2-830-692-PPD(2068). WWW. POSTPARTUM. NET   · Writing or talking about death, such as writing suicide notes or talking about guns, knives, or pills. Keep the numbers for these national suicide hotlines: 4-016-472-TALK (1-162.480.3509) and 5-423-NORDYDR (8-381.902.5113). If you or someone you know talks about suicide or feeling hopeless, get help right away. Constipation and Hemorrhoids  · Drink plenty of fluids, enough so that your urine is light yellow or clear like water. If you have kidney, heart, or liver disease and have to limit fluids, talk with your doctor before you increase the amount of fluids you drink. · Eat plenty of fiber each day.  Have a bran muffin or bran cereal for breakfast, and try eating a piece of fruit for a mid-afternoon snack. · For painful, itchy hemorrhoids, put ice or a cold pack on the area several times a day for 10 minutes at a time. Follow this by putting a warm compress on the area for another 10 to 20 minutes or by sitting in a shallow, warm bath. When should you call for help? Call 911 anytime you think you may need emergency care. For example, call if:  · You are thinking of hurting yourself, your baby, or anyone else. · You passed out (lost consciousness). · You have symptoms of a blood clot in your lung (called a pulmonary embolism). These may include:    · Sudden chest pain. · Trouble breathing. · Coughing up blood. Call your doctor now or seek immediate medical care if:  · You have severe vaginal bleeding. · You are soaking through a pad each hour for 2 or more hours. · Your vaginal bleeding seems to be getting heavier or is still bright red 4 days after delivery. · You are dizzy or lightheaded, or you feel like you may faint. · You are vomiting or cannot keep fluids down. · You have a fever. · You have new or more belly pain. · You pass tissue (not just blood). · Your vaginal discharge smells bad. · Your belly feels tender or full and hard. · Your breasts are continuously painful or red. · You feel sad, anxious, or hopeless for more than a few days. · You have sudden, severe pain in your belly. · You have symptoms of a blood clot in your leg (called a deep vein thrombosis),          such as:  · Pain in your calf, back of the knee, thigh, or groin. · Redness and swelling in your leg or groin. · You have symptoms of preeclampsia, such as:  · Sudden swelling of your face, hands, or feet. · New vision problems (such as dimness or blurring). · A severe headache. · Your blood pressure is higher than it should be or rises suddenly. · You have new nausea or vomiting.     Watch closely for changes in your health, and be sure to contact your doctor if you have any problems. Additional Information:  Not applicable    These are general instructions for a healthy lifestyle:    No smoking/ No tobacco products/ Avoid exposure to second hand smoke    Surgeon General's Warning:  Quitting smoking now greatly reduces serious risk to your health. Obesity, smoking, and sedentary lifestyle greatly increases your risk for illness    A healthy diet, regular physical exercise & weight monitoring are important for maintaining a healthy lifestyle    Recognize signs and symptoms of STROKE:    F-face looks uneven    A-arms unable to move or move unevenly    S-speech slurred or non-existent    T-time-call 911 as soon as signs and symptoms begin - DO NOT go       back to bed or wait to see if you get better - TIME IS BRAIN. I have had the opportunity to make my options or choices for discharge. I have received and understand these instructions. POSTPARTUM DISCHARGE INSTRUCTIONS       Name:  Jasmin Oh  YOB: 1995  Admission Diagnosis:  Normal labor [O80, Z37.9]  Normal labor [O80, Z37.9]     Discharge Diagnosis:    Problem List as of 4/9/2019 Never Reviewed          Codes Class Noted - Resolved    Normal labor ICD-10-CM: O80, Z37.9  ICD-9-CM: 753  4/6/2019 - Present            Attending Physician:  Oletha Cabot, MD    Delivery Type:  Vaginal Childbirth with Episiotomy, Laceration or Tear: What To Expect At [address] [place] [address] body will slowly heal in the next few weeks. It is easy to get too tired and overwhelmed during the first weeks after your baby is born. Changes in your hormones can shift your mood without warning. You may find it hard to meet the extra demands on your energy and time. Take it easy on yourself. Follow-up care is a key part of your treatment and safety. Be sure to make and go to all appointments, and call your doctor if you are having problems.  It's also a good idea to know your test results and keep a list of the medicines you take. How can you care for yourself at home? Vaginal Bleeding and Cramps  · After delivery, you will have a bloody discharge from the vagina. This will turn pink within a week and then white or yellow after about 10 days. It may last for 2 to 4 weeks or longer, until the uterus has healed. Use pads instead of tampons until you stop bleeding. · Do not worry if you pass some blood clots, as long as they are smaller than a golf ball. If you have a tear or stitches in your vaginal area, change the pad at least every 4 hours to prevent soreness and infection. · You may have cramps for the first few days after childbirth. These are normal and occur as the uterus shrinks to normal size. Take an over-the-counter pain medicine, such as acetaminophen (Tylenol), ibuprofen (Advil, Motrin), or naproxen (Aleve), for cramps. Read and follow all instructions on the label. Do not take aspirin, because it can cause more bleeding. Do not take acetaminophen (Tylenol) and other acetaminophen containing medications (i.e. Percocet) at the same time. Episiotomy, Lacerations or Tears  · If you have stitches, they will dissolve on their own and do not need to be removed. · Put ice or a cold pack on your painful area for 10 to 20 minutes at a time, several times a day, for the first few days. Put a thin cloth between the ice and your skin. · Sit in a few inches of warm water (sitz bath) 3 times a day and after bowel movements. The warm water helps with pain and itching. If you do not have a tub, a warm shower might help. Breast fullness  · Your breasts may overfill (engorge) in the first few days after delivery. To help milk flow and to relieve pain, warm your breasts in the shower or by using warm, moist towels before nursing. · If you are not nursing, do not put warmth on your breasts or touch your breasts. Wear a tight bra or sports bra and use ice until the fullness goes away.  This usually takes 2 to 3 days. · Put ice or a cold pack on your breast after nursing to reduce swelling and pain. Put a thin cloth between the ice and your skin. Activity  · Eat a balanced diet. Do not try to lose weight by cutting calories. Keep taking your prenatal vitamins, or take a multivitamin. · Get as much rest as you can. Try to take naps when your baby sleeps during the day. · Get some exercise every day. But do not do any heavy exercise until your doctor says it is okay. · Wait until you are healed (about 4 to 6 weeks) before you have sexual intercourse. Your doctor will tell you when it is okay to have sex. · Talk to your doctor about birth control. You can get pregnant even before your period returns. Also, you can get pregnant while you are breast-feeding. Mental Health  · Many women get the \"baby blues\" during the first few days after childbirth. You may lose sleep, feel irritable, and cry easily. You may feel happy one minute and sad the next. Hormone changes are one cause of these emotional changes. Also, the demands of a new baby, along with visits from relatives or other family needs, add to a mother's stress. The \"baby blues\" often peak around the fourth day. Then they ease up in less than 2 weeks. · If your moodiness or anxiety lasts for more than 2 weeks, or if you feel like life is not worth living, you may have postpartum depression. This is different for each mother. Some mothers with serious depression may worry intensely about their infant's well-being. Others may feel distant from their child. Some mothers might even feel that they might harm their baby. A mother may have signs of paranoia, wondering if someone is watching her. · With all the changes in your life, you may not know if you are depressed. Pregnancy sometimes causes changes in how you feel that are similar to the symptoms of depression.   · Symptoms of depression include:  · Feeling sad or hopeless and losing interest in daily activities. These are the most common symptoms of depression. · Sleeping too much or not enough. · Feeling tired. You may feel as if you have no energy. · Eating too much or too little. · POSTPARTUM SUPPORT INTERNATIONAL (PSI) offers a Warm line; Chat with the Expert phone sessions; Information and Articles about Pregnancy and Postpartum Mood Disorders; Comprehensive List of Free Support Groups; Knowledgeable local coordinators who will offer support, information, and resources; Guide to Resources on [employer]; Calendar of events in the  mood disorders community; Latest News and Research; and [facility] [address] for [employer]. Remember - You are not alone; You are not to blame; With help, you will be well. 1-966-064-PPD(5906). WWW. POSTPARTUM. NET    · Writing or talking about death, such as writing suicide notes or talking about guns, knives, or pills. Keep the numbers for these national suicide hotlines: 8-585-773-TALK (9-150.955.7731) and 2-725-RIDUITN (3-464.876.1253). If you or someone you know talks about suicide or feeling hopeless, get help right away. Constipation and Hemorrhoids  Drink plenty of fluids, enough so that your urine is light yellow or clear like water. If you have kidney, heart, or liver disease and have to limit fluids, talk with your doctor before you increase the amount of fluids you drink. · Eat plenty of fiber each day. Have a bran muffin or bran cereal for breakfast, and try eating a piece of fruit for a mid-afternoon snack. · For painful, itchy hemorrhoids, put ice or a cold pack on the area several times a day for 10 minutes at a time. Follow this by putting a warm compress on the area for another 10 to 20 minutes or by sitting in a shallow, warm bath. When should you call for help? Call 911 anytime you think you may need emergency care. For example, call if:  · You are thinking of hurting yourself, your baby, or anyone else.   · You passed out (lost consciousness). · You have symptoms of a blood clot in your lung (called a pulmonary embolism). These may include:  · Sudden chest pain. · Trouble breathing. · Coughing up blood. Call your doctor now or seek immediate medical care if:  · You have severe vaginal bleeding. · You are soaking through a pad each hour for 2 or more hours. · Your vaginal bleeding seems to be getting heavier or is still bright red 4 days after delivery. · You are dizzy or lightheaded, or you feel like you may faint. · You are vomiting or cannot keep fluids down. · You have a fever. · You have new or more belly pain. · You pass tissue (not just blood). · Your vaginal discharge smells bad. · Your belly feels tender or full and hard. · Your breasts are continuously painful or red. · You feel sad, anxious, or hopeless for more than a few days. · You have sudden, severe pain in your belly. · You have symptoms of a blood clot in your leg (called a deep vein thrombosis), such as:  · Pain in your calf, back of the knee, thigh, or groin. · Redness and swelling in your leg or groin. · You have symptoms of preeclampsia, such as:  · Sudden swelling of your face, hands, or feet. · New vision problems (such as dimness or blurring). · A severe headache. · Your blood pressure is higher than it should be or rises suddenly. · You have new nausea or vomiting. Watch closely for changes in your health, and be sure to contact your doctor if you have any problems. Additional Information:  Postpartum Support    PARENTS:  Are you feeling sad or depressed? Is it difficult for you to enjoy yourself? Do you feel more irritable or tense? Do you feel anxious or panicky? Are you having difficulty bonding with your baby? Do you feel as if you are \"out of control\" or \"going crazy\"? Are you worried that you might hurt your baby or yourself? FAMILIES: Do you worry that something is wrong but don't know how to help?  Do you think that your partner or spouse is having problems coping? Are you worried that it may never get better? While many women experience some mild mood change or \"the blues\" during or after the birth of a child, 1 in 9 women experience more significant symptoms of depression or anxiety. 1 in 10 Dads become depressed during the first year. Things you can do  Being a good parent includes taking care of yourself. If you take care of yourself, you will be able to take better care of your baby and your family. · Talk to a counselor or healthcare provider who has training in  mood and anxiety problems. · Learn as much as you can about pregnancy and postpartum depression and anxiety. · Get support from family and friends. Ask for help when you need it. · Join a support group in your area or online. · Keep active by walking, stretching or whatever form of exercise helps you to feel better. · Get enough rest and time for yourself. · Eat a healthy diet. · Don't give up! It may take more than one try to get the right help you need. These are general instructions for a healthy lifestyle:    No smoking/ No tobacco products/ Avoid exposure to second hand smoke    Surgeon General's Warning:  Quitting smoking now greatly reduces serious risk to your health. Obesity, smoking, and sedentary lifestyle greatly increases your risk for illness    A healthy diet, regular physical exercise & weight monitoring are important for maintaining a healthy lifestyle    Recognize signs and symptoms of STROKE:    F-face looks uneven    A-arms unable to move or move unevenly    S-speech slurred or non-existent    T-time-call 911 as soon as signs and symptoms begin - DO NOT go       back to bed or wait to see if you get better - TIME IS BRAIN. I have had the opportunity to make my options or choices for discharge. I have received and understand these instructions.     Breastfeeding Support Group  [facility] Nursing Mothers Group meets the 1st and 3rd Tuesday of each month in the [facility] from 10:00-11:00, (located behind the cafeteria on the first floor) Meetings are facilitated by board certified lactation consultants and all breastfeeding moms and their infants are invited. Postpartum Support Group-Offering Hope and Help for New Mothers  The Postpartum Support Group meets on the 1st and 3rd Saturdays of each month from 9:00am-10:30am in the [address] [facility], located on the [facility] floor, behind the cafeteria.

## 2019-04-10 LAB
ABO + RH BLD: NORMAL
BLD PROD TYP BPU: NORMAL
BLOOD GROUP ANTIBODIES SERPL: NORMAL
BLOOD GROUP ANTIBODIES SERPL: NORMAL
BPU ID: NORMAL
CROSSMATCH RESULT,%XM: NORMAL
PHYSICIAN INSTRUCTIO,%PI: NORMAL
SPECIMEN EXP DATE BLD: NORMAL
STATUS OF UNIT,%ST: NORMAL
UNIT DIVISION, %UDIV: 0

## 2021-05-14 ENCOUNTER — INITIAL PRENATAL (OUTPATIENT)
Dept: OBGYN CLINIC | Age: 26
End: 2021-05-14
Payer: COMMERCIAL

## 2021-05-14 VITALS
WEIGHT: 137 LBS | SYSTOLIC BLOOD PRESSURE: 106 MMHG | HEIGHT: 64 IN | DIASTOLIC BLOOD PRESSURE: 64 MMHG | BODY MASS INDEX: 23.39 KG/M2

## 2021-05-14 DIAGNOSIS — N91.2 AMENORRHEA: ICD-10-CM

## 2021-05-14 DIAGNOSIS — Z32.00 VISIT FOR CONFIRMATION OF PREGNANCY TEST RESULT WITH PHYSICAL EXAM: ICD-10-CM

## 2021-05-14 DIAGNOSIS — Z34.80 SUPERVISION OF OTHER NORMAL PREGNANCY: Primary | ICD-10-CM

## 2021-05-14 PROBLEM — Z3A.08 8 WEEKS GESTATION OF PREGNANCY: Status: ACTIVE | Noted: 2021-05-14

## 2021-05-14 LAB
CHLAMYDIA, EXTERNAL: NORMAL
N. GONORRHEA, EXTERNAL: NORMAL

## 2021-05-14 PROCEDURE — 99213 OFFICE O/P EST LOW 20 MIN: CPT | Performed by: ADVANCED PRACTICE MIDWIFE

## 2021-05-14 RX ORDER — DOXYLAMINE SUCCINATE AND PYRIDOXINE HYDROCHLORIDE 20; 20 MG/1; MG/1
1 TABLET, EXTENDED RELEASE ORAL 2 TIMES DAILY
Qty: 60 TAB | Refills: 3 | Status: SHIPPED | OUTPATIENT
Start: 2021-05-14 | End: 2021-12-26

## 2021-05-14 RX ORDER — ONDANSETRON 4 MG/1
4 TABLET, ORALLY DISINTEGRATING ORAL
Qty: 30 TAB | Refills: 5 | Status: SHIPPED | OUTPATIENT
Start: 2021-05-14 | End: 2021-12-26

## 2021-05-14 NOTE — PATIENT INSTRUCTIONS
Weeks 6 to 10 of Your Pregnancy: Care Instructions Overview During the first 6 to 10 weeks of your pregnancy, your body goes through many changes. Your baby grows very quickly, even though you can't feel it yet. You may start to feel different, both in your body and your emotions. Because each pregnancy is unique, there's no right way to feel. You may feel the healthiest you've ever been, or you might feel tired or sick to your stomach (\"morning sickness\"). These early weeks are a time to make healthy choices and to eat the best foods for you and your baby. This is also a good time to think about birth defects testing. These are tests done during pregnancy to look for possible problems with the baby. First-trimester tests for birth defects can be done between 8 and 13 weeks of pregnancy, depending on the test. Talk with your doctor about what kinds of tests are available. Follow-up care is a key part of your treatment and safety. Be sure to make and go to all appointments, and call your doctor if you are having problems. It's also a good idea to know your test results and keep a list of the medicines you take. How can you care for yourself at home? Eat well · Eat at least 3 meals and 2 healthy snacks every day. Eat fresh, whole foods, including: 
? 7 or more servings of bread, tortillas, cereal, rice, pasta, or oatmeal. 
? 3 or more servings of vegetables, especially leafy green vegetables. ? 2 or more servings of fruits. ? 3 or more servings of milk, yogurt, or cheese. ? 2 or more servings of meat, turkey, chicken, fish, eggs, or dried beans. · Drink plenty of fluids, especially water. Avoid sodas and other sweetened drinks. · Choose foods that have important vitamins for your baby, such as calcium, iron, and folate. ? Dairy products, tofu, canned fish with bones, almonds, broccoli, dark leafy greens, corn tortillas, and fortified orange juice are good sources of calcium. ?  Beef, poultry, liver, spinach, lentils, dried beans, fortified cereals, and dried fruits are rich in iron. ? Dark leafy greens, broccoli, asparagus, liver, fortified cereals, orange juice, peanuts, and almonds are good sources of folate. · Avoid foods that could harm your baby. ? Do not eat raw or undercooked meat, chicken, or fish (such as sushi or raw oysters). ? Do not eat raw eggs or foods that contain raw eggs, such as Caesar dressing. ? Do not eat soft cheeses and unpasteurized dairy foods, such as Brie, feta, or blue cheese. ? Do not eat fish that contains a lot of mercury, such as shark, swordfish, tilefish, or pratik mackerel. Do not eat more than 6 ounces of tuna each week. ? Do not eat raw sprouts, especially alfalfa sprouts. ? Cut down on caffeine, such as coffee, tea, and cola. Protect yourself and your baby · Do not touch fiorella litter or cat feces. They can cause an infection that could harm your baby. · High body temperature can be harmful to your baby. So if you want to use a sauna or hot tub, be sure to talk to your doctor about how to use it safely. [place] with morning sickness · Sip small amounts of water, juices, or shakes. Try drinking between meals, not with meals. · Eat 5 or 6 small meals a day. Try dry toast or crackers when you first get up, and eat breakfast a little later. · Avoid spicy, greasy, and fatty foods. · When you feel sick, open your windows or go for a short walk to get fresh air. · Try nausea wristbands. These help some women. · Tell your doctor if you think your prenatal vitamins make you sick. Where can you learn more? Go to http://www.gray.com/ Enter G112 in the search box to learn more about \"Weeks 6 to 10 of Your Pregnancy: Care Instructions. \" Current as of: October 8, 2020               Content Version: 12.8 © 9465-4547 [employer].   
Care instructions adapted under license by [employer] (which disclaims liability or warranty for this information). If you have questions about a medical condition or this instruction, always ask your healthcare professional. [place] [address] any warranty or liability for your use of this information.

## 2021-05-14 NOTE — PROGRESS NOTES
Current pregnancy history:    Karina Szymanski is a  26 y.o. female DECLINED No LMP recorded. .  She presents for the evaluation of amenorrhea and a positive pregnancy test.    LMP history:  The date of her LMP is  certain. Her last menstrual period was normal.  A urine pregnancy test was positive      Based on her LMP, her EDC is 2021 and her EGA is 8 weeks,4 days. Her menstrual cycles are regular and occur approximately every 28 days  and range from 3 to 5 days. Ultrasound data:  She had an  ultrasound done by the ultrasound tech today which revealed a viable simmons pregnancy with a gestational age of 11 weeks and 4 days giving an [facility] of 2021. Pregnancy symptoms:    Since her LMP she has experienced  urinary frequency, breast tenderness, and nausea. She has not been vomiting over the last few weeks. Associated signs and symptoms which she denies: dysuria, discharge, vaginal bleeding. Relevant past pregnancy history:   She has the following pregnancy history: Her last pregnancy was uncomplicated. She has no history of  delivery. Relevant past medical history:(relevant to this pregnancy): noncontributory. Pap/Occupational history:  Last pap smear: last year Results: Normal            Substance history: negative for alcohol, tobacco and street drugs. Positive for nothing. Exposure history: There is/are no indoor cat/s in the home. The patient was instructed to not change the cat litter. She admits close contact with children on a regular basis. She has had chicken pox or the vaccine in the past.   Patient denies issues with domestic violence. Genetic Screening/Teratology Counseling: (Includes patient, baby's father, or anyone in either family with:)  3.  Patient's age >/= 28 at EDC?--no  2.   Thalassemia ([facility], [place], [address], or  background): MCV<80?--no.     3.  Neural tube defect (meningomyelocele, spina bifida, anencephaly)?--no.   4.  Congenital heart defect?--no.  5.  Down syndrome?--no.   6.  Marcus-Sachs ([employer], [place])?--no.   7.  Canavan's Disease?--no.   8.  Familial Dysautonomia?--no.   9.  Sickle cell disease or trait ()? --no   The patient has not been tested for sickle trait  10. Hemophilia or other blood disorders?--no. 11.  Muscular dystrophy?--no. 12.  Cystic fibrosis?--no. 13.  [place]'s Chorea?--no. 14.  Mental retardation/autism (if yes was person tested for Fragile X)?--no. 15.  Other inherited genetic or chromosomal disorder?--no. 12.  Maternal metabolic disorder (DM, PKU, etc)?--no. 17.  Patient or FOB with a child with a birth defect not listed above?--no.  17a. Patient or FOB with a birth defect themselves?--no. 18.  Recurrent pregnancy loss, or stillbirth?--no. 19.  Any medications since LMP other than prenatal vitamins (include vitamins, supplements, OTC meds, drugs, alcohol)?--no. 20.  Any other genetic/environmental exposure to discuss?--no. Infection History:  1. Lives with someone with TB or TB exposed?--no.   2.  Patient or partner has history of genital herpes?--no.  3.  Rash or viral illness since LMP?--no.    4.  History of STD (GC, CT, HPV, syphilis, HIV)? --no   5. Other: OTHER? No past medical history on file. No past surgical history on file. Social History     Occupational History    Not on file   Tobacco Use    Smoking status: Never Smoker    Smokeless tobacco: Never Used   Substance and Sexual Activity    Alcohol use: Never     Frequency: Never    Drug use: Never    Sexual activity: Yes     Partners: Male     Birth control/protection: None     No family history on file.   OB History    Para Term  AB Living   1 1 1     1   SAB TAB Ectopic Molar Multiple Live Births           0 1      # Outcome Date GA Lbr Chuy/2nd Weight Sex Delivery Anes PTL Lv   1 Term 19 39w1d 28:47 / 00:55 7 lb 0.7 oz (3.195 kg) M Vag-Spont EPI N [place] No Known Allergies  Prior to Admission medications    Medication Sig Start Date End Date Taking? Authorizing Provider   ibuprofen (MOTRIN) 800 mg tablet Take 1 Tab by mouth every eight (8) hours. 4/9/19   Crescencio Lira DO        Review of Systems: History obtained from the patient  Constitutional: negative for weight loss, fever, night sweats  HEENT: negative for hearing loss, earache, congestion, snoring, sore throat  CV: negative for chest pain, palpitations, edema  Resp: negative for cough, shortness of breath, wheezing  Breast: negative for breast lumps, nipple discharge, galactorrhea  GI: negative for change in bowel habits, abdominal pain, black or bloody stools  : negative for frequency, dysuria, hematuria, vaginal discharge  MSK: negative for back pain, joint pain, muscle pain  Skin: negative for itching, rash, hives  Neuro: negative for dizziness, headache, confusion, weakness  Psych: negative for anxiety, depression, change in mood  Heme/lymph: negative for bleeding, bruising, pallor    Objective: There were no vitals taken for this visit.     Physical Exam:     Constitutional  · Appearance: well-nourished, well developed, alert, in no acute distress    HENT  · Head  · Face: appears normal  · Eyes: appear normal  · Ears: normal  · Mouth: normal  · Lips: no lesions      Chest  · Respiratory Effort: breathing unlabored     Cardiovascular  · Heart:  · Auscultation: regular rate and rhythm without murmur      Gastrointestinal  · Abdominal Examination: abdomen non-tender to palpation, normal bowel sounds, no masses present  · Liver and spleen: no hepatomegaly present, spleen not palpable  · Hernias: no hernias identified    Genitourinary  · deferred    Skin  · General Inspection: no rash, no lesions identified    Neurologic/Psychiatric  · Mental Status:  · Orientation: grossly oriented to person, place and time  · Mood and Affect: mood normal, affect appropriate    Assessment:   Intrauterine pregnancy with issues addressed in problem list    Having dizziness and nausea- will send in RX  20# weight loss with first pregnancy due to vomiting -     Concerned about blood sugar- pt states she is very dizzy in the morning then eats and she feels better      Plan:   Patient declines presence of chaperone during today's visit. Offered CF testing, CVS, Nuchal Translucency, MSAFP, amnio, and discussed NIPT  Course of pregnancy discussed including visit schedule, routine U/S, glucola testing, etc.  Avoid alcoholic beverages and illicit/recreational drugs use  Take prenatal vitamins or folic acid daily. Hospital and practice style discussed with coverage system. Discussed nutrition, toxoplasmosis precautions, sexual activity, exercise, need for influenza vaccine, environmental and work hazards, travel advice, screen for domestic violence, need for seat belts. Discussed seafood, unpasteurized dairy products, deli meat, artificial sweeteners, and caffeine. Discussed current prescription drug use. Given medication list.  Discussed the use of over the counter medications and chemicals. Route of delivery discussed, including risks, benefits     Handouts given to pt.     Delphine Davis CNM

## 2021-06-11 ENCOUNTER — ROUTINE PRENATAL (OUTPATIENT)
Dept: OBGYN CLINIC | Age: 26
End: 2021-06-11
Payer: COMMERCIAL

## 2021-06-11 VITALS — DIASTOLIC BLOOD PRESSURE: 68 MMHG | BODY MASS INDEX: 23.17 KG/M2 | SYSTOLIC BLOOD PRESSURE: 104 MMHG | WEIGHT: 135 LBS

## 2021-06-11 DIAGNOSIS — Z34.82 PRENATAL CARE, SUBSEQUENT PREGNANCY IN SECOND TRIMESTER: ICD-10-CM

## 2021-06-11 DIAGNOSIS — Z36.9 UNSPECIFIED ANTENATAL SCREENING: Primary | ICD-10-CM

## 2021-06-11 DIAGNOSIS — Z3A.12 12 WEEKS GESTATION OF PREGNANCY: ICD-10-CM

## 2021-06-11 LAB
HBSAG, EXTERNAL: NORMAL
HBV SURFACE AG SER QL: <0.1 INDEX
HBV SURFACE AG SER QL: NEGATIVE
HIV 1+2 AB+HIV1 P24 AG SERPL QL IA: NONREACTIVE
HIV, EXTERNAL: NORMAL
HIV12 RESULT COMMENT, HHIVC: NORMAL
RUBV IGG SER-IMP: REACTIVE
RUBV IGG SERPL IA-ACNC: 125.5 IU/ML
T. PALLIDUM, EXTERNAL: NORMAL

## 2021-06-11 PROCEDURE — 0502F SUBSEQUENT PRENATAL CARE: CPT | Performed by: ADVANCED PRACTICE MIDWIFE

## 2021-06-11 NOTE — PROGRESS NOTES
NOB labs today  Declines Genetic screening  Nausea improving, still has some dizziness nilay in car, recommend trying Sea Bands  Much better than last pregnancy

## 2021-06-11 NOTE — PATIENT INSTRUCTIONS
Weeks 10 to 14 of Your Pregnancy: Care Instructions  Overview     By weeks 10 to 14 of your pregnancy, the placenta has formed inside your uterus. The placenta's main job is to give your baby oxygen and nutrients through the umbilical cord. It's possible to hear your baby's heartbeat with a special ultrasound device. Your baby's organs are developing. The arms and legs can bend. This is a good time to think about testing for birth defects. There are two types of tests: screening and diagnostic. Screening tests show the chance that a baby has a certain birth defect. They can't tell you for sure that your baby has a problem. Diagnostic tests show if a baby has a certain birth defect. It's your choice whether to have these tests. You and your partner can talk to your doctor or midwife about tests for birth defects. Follow-up care is a key part of your treatment and safety. Be sure to make and go to all appointments, and call your doctor if you are having problems. It's also a good idea to know your test results and keep a list of the medicines you take. How can you care for yourself at home? Decide about tests  · You can have screening tests and diagnostic tests to check for birth defects. The decision to have a test for birth defects is personal. Think about your age, your chance of passing on a family disease, your need to know about any problems, and what you might do after you have the test results. ? Quadruple (quad) blood test. This screening test can be done between 15 and 22 weeks of pregnancy. It checks the amount of four substances in your blood. The doctor looks at these test results, along with your age and other factors, to find out the chance that your baby may have certain problems. ? Amniocentesis. This diagnostic test is used to look for chromosomal problems in the baby's cells.  It can be done between 15 and 20 weeks of pregnancy, usually around week 16.  ? Nuchal translucency test. This test uses ultrasound to measure the thickness of the area at the back of the baby's neck. An increase in the thickness can be an early sign of Down syndrome. ? Chorionic villus sampling (CVS). This is a test that looks for certain genetic problems with your baby. The same genes that are in your baby are in the placenta. A small piece of the placenta is taken out and tested. This test is done when you are 10 to 13 weeks pregnant. Ease discomfort  · Slow down and take naps when you feel tired. · If your emotions swing, talk to someone. · If your gums bleed, try a softer toothbrush. If your gums are puffy and bleed a lot, see your dentist.  · If you feel dizzy:  ? Get up slowly after sitting or lying down. ? Drink plenty of fluids. ? Eat small snacks to keep your blood sugar stable. ? Put your head between your legs as though you were tying your shoelaces. ? Lie down with your legs higher than your head. Use pillows to prop up your feet. · If you have a headache:  ? Lie down. ? Ask your partner or a good friend for a neck massage. ? Try cool cloths over your forehead or across the back of your neck. ? Use acetaminophen (Tylenol) for pain relief. Do not use nonsteroidal anti-inflammatory drugs (NSAIDs), such as ibuprofen (Advil, Motrin) or naproxen (Aleve), unless your doctor says it is okay. · If you have a nosebleed, pinch your nose gently, and hold it for a short while. To prevent nosebleeds, try massaging a small dab of petroleum jelly, such as Vaseline, in your nostrils. · If your nose is stuffed up, try saline (saltwater) nose sprays. Do not use decongestant sprays. Care for your breasts  · Wear a bra that gives you good support. · Know that changes in your breasts are normal.  ? Your breasts may get larger and more tender. Tenderness usually gets better by 12 weeks. ? Your nipples may get darker and larger, and small bumps around your nipples may show more. ?  The veins in your chest and breasts may show more. Where can you learn more? Go to http://www.gray.com/  Enter A582 in the search box to learn more about \"Weeks 10 to 14 of Your Pregnancy: Care Instructions. \"  Current as of: October 8, 2020               Content Version: 12.8  © 8972-6071 [employer]. Care instructions adapted under license by [employer] (which disclaims liability or warranty for this information). If you have questions about a medical condition or this instruction, always ask your healthcare professional. [address] any warranty or liability for your use of this information.

## 2021-06-12 LAB
BACTERIA SPEC CULT: NORMAL
SERVICE CMNT-IMP: NORMAL

## 2021-06-13 LAB
C TRACH RRNA SPEC QL NAA+PROBE: NEGATIVE
N GONORRHOEA RRNA SPEC QL NAA+PROBE: NEGATIVE
SPECIMEN STATUS REPORT, ROLRST: NORMAL
T VAGINALIS DNA SPEC QL NAA+PROBE: NEGATIVE
VZV IGG SER IA-ACNC: 478 INDEX

## 2021-06-14 ENCOUNTER — TELEPHONE (OUTPATIENT)
Dept: OBGYN CLINIC | Age: 26
End: 2021-06-14

## 2021-06-14 LAB — T PALLIDUM AB SER QL IA: NON REACTIVE

## 2021-06-14 NOTE — TELEPHONE ENCOUNTER
Call received at 600am    26year old patient  13w9d pregnant.     Mardella Primrose calling from   [facility] lab to let us know that they were not able to run the type and screen sample due to  QNS sent on 2021      [facility]

## 2021-07-09 ENCOUNTER — ROUTINE PRENATAL (OUTPATIENT)
Dept: OBGYN CLINIC | Age: 26
End: 2021-07-09
Payer: COMMERCIAL

## 2021-07-09 VITALS — BODY MASS INDEX: 23.34 KG/M2 | DIASTOLIC BLOOD PRESSURE: 68 MMHG | SYSTOLIC BLOOD PRESSURE: 114 MMHG | WEIGHT: 136 LBS

## 2021-07-09 DIAGNOSIS — Z3A.16 16 WEEKS GESTATION OF PREGNANCY: ICD-10-CM

## 2021-07-09 DIAGNOSIS — Z36.9 UNSPECIFIED ANTENATAL SCREENING: Primary | ICD-10-CM

## 2021-07-09 LAB
ABO + RH BLD: NORMAL
BLOOD BANK CMNT PATIENT-IMP: NORMAL
BLOOD GROUP ANTIBODIES SERPL: NORMAL
SPECIMEN EXP DATE BLD: NORMAL

## 2021-07-09 PROCEDURE — 0502F SUBSEQUENT PRENATAL CARE: CPT | Performed by: ADVANCED PRACTICE MIDWIFE

## 2021-07-09 NOTE — PATIENT INSTRUCTIONS

## 2021-07-09 NOTE — PROGRESS NOTES
Neck pain  A lot of headaches  Lower abdomen feels hard intermittently about one time a day, usually when rolling over in bed  Lower abdominal pressure  Only drinks a cup or 2 of water a day  Doesn't drink water, \"it makes me have to pee too much'  Enc to increase water intake to 100 oz Q day for abd pain and HA  See PCP for Neck pain if it continues  T&S today (missed with NOB labs)  FAS next visit

## 2021-08-02 ENCOUNTER — ROUTINE PRENATAL (OUTPATIENT)
Dept: OBGYN CLINIC | Age: 26
End: 2021-08-02

## 2021-08-02 VITALS — BODY MASS INDEX: 23.52 KG/M2 | WEIGHT: 137 LBS | SYSTOLIC BLOOD PRESSURE: 124 MMHG | DIASTOLIC BLOOD PRESSURE: 70 MMHG

## 2021-08-02 DIAGNOSIS — Z34.82 PRENATAL CARE, SUBSEQUENT PREGNANCY IN SECOND TRIMESTER: Primary | ICD-10-CM

## 2021-08-02 DIAGNOSIS — Z3A.20 20 WEEKS GESTATION OF PREGNANCY: ICD-10-CM

## 2021-08-02 DIAGNOSIS — Z3A.08 8 WEEKS GESTATION OF PREGNANCY: ICD-10-CM

## 2021-08-02 PROCEDURE — 0502F SUBSEQUENT PRENATAL CARE: CPT | Performed by: ADVANCED PRACTICE MIDWIFE

## 2021-08-02 NOTE — PROGRESS NOTES
Feeling \"water coming out\" when sneezing  Thinks it is urine  Reassured  GFM  Rev Ant placenta, other aspects of normal screening US  Rev routines      FETAL SURVEY 8/2/21  A SINGLE VIABLE IUP AT 20W0D IS SEEN. FETAL CARDIAC MOTION OBSERVED. FETAL ANATOMY WAS WELL VISUALIZED AND APPEARS WNL. NO ABNORMALITIES WERE SEEN ON TODAYS EXAM.  APPROPRIATE GROWTH MEASURED. SIZE = DATES. CRESENCIO, PLACENTA AND CERVIX APPEAR WITHIN NORMAL LIMITS.   GENDER: MALE

## 2021-08-02 NOTE — PATIENT INSTRUCTIONS
Weeks 18 to 22 of Your Pregnancy: Care Instructions  Overview     Your baby is continuing to develop quickly. Sometime between 18 and 22 weeks, you'll probably start to feel your baby move. At first, these small fetal movements feel like fluttering or \"butterflies. \" Some women say that they feel like gas bubbles. As your baby grows, these movements will become stronger. You may also notice that your baby hiccups. Babies at this stage can now suck their thumbs. You may find that your nausea and fatigue are gone. You may feel better overall and have more energy than you did in your first trimester. But you might now also have some new discomforts, like sleep problems or leg cramps. Talk to your doctor about things you can do at home to ease these problems. Follow-up care is a key part of your treatment and safety. Be sure to make and go to all appointments, and call your doctor if you are having problems. It's also a good idea to know your test results and keep a list of the medicines you take. How can you care for yourself at home? Ease sleep problems  · Avoid caffeine in drinks or chocolate late in the day. · Get some exercise every day. · Take a warm shower or bath before bed. · Have a light snack or glass of milk at bedtime. · Do relaxation exercises in bed to calm your mind and body. · Support your legs and back with extra pillows. Try a pillow between your legs if you sleep on your side. · Do not use sleeping pills or alcohol. They could harm your baby. Ease leg cramps  · Do not massage your calf during the cramp. · Sit on a firm bed or chair. Straighten your leg, and bend your foot (flex your ankle) slowly upward, toward your knee. Bend your toes up and down. · Stand on a cool, flat surface. Stretch your toes upward, and take small steps walking on your heels. · Use a heating pad or hot water bottle to help with muscle ache. Prevent leg cramps  · Be sure to get enough calcium.  If you are worried that you are not getting enough, talk to your doctor. · Exercise every day, and stretch your legs before bed. · Take a warm bath before bed, and try leg warmers at night. Where can you learn more? Go to http://www.gray.com/  Enter M220 in the search box to learn more about \"Weeks 18 to 22 of Your Pregnancy: Care Instructions. \"  Current as of: October 8, 2020               Content Version: 12.8  © 2006-2021 [employer]. Care instructions adapted under license by [employer] (which disclaims liability or warranty for this information). If you have questions about a medical condition or this instruction, always ask your healthcare professional. [address] any warranty or liability for your use of this information.

## 2021-09-02 ENCOUNTER — ROUTINE PRENATAL (OUTPATIENT)
Dept: OBGYN CLINIC | Age: 26
End: 2021-09-02
Payer: COMMERCIAL

## 2021-09-02 VITALS
DIASTOLIC BLOOD PRESSURE: 66 MMHG | HEIGHT: 64 IN | SYSTOLIC BLOOD PRESSURE: 102 MMHG | BODY MASS INDEX: 24.41 KG/M2 | WEIGHT: 143 LBS

## 2021-09-02 DIAGNOSIS — Z3A.24 24 WEEKS GESTATION OF PREGNANCY: ICD-10-CM

## 2021-09-02 DIAGNOSIS — Z34.80 SUPERVISION OF OTHER NORMAL PREGNANCY, ANTEPARTUM: Primary | ICD-10-CM

## 2021-09-02 LAB
ERYTHROCYTE [DISTWIDTH] IN BLOOD BY AUTOMATED COUNT: 13.7 % (ref 11.5–14.5)
GLUCOSE 1H P 100 G GLC PO SERPL-MCNC: 93 MG/DL (ref 65–140)
HCT VFR BLD AUTO: 31.1 % (ref 35–47)
HGB BLD-MCNC: 10.3 G/DL (ref 11.5–16)
MCH RBC QN AUTO: 29.9 PG (ref 26–34)
MCHC RBC AUTO-ENTMCNC: 33.1 G/DL (ref 30–36.5)
MCV RBC AUTO: 90.1 FL (ref 80–99)
NRBC # BLD: 0 K/UL (ref 0–0.01)
NRBC BLD-RTO: 0 PER 100 WBC
PLATELET # BLD AUTO: 221 K/UL (ref 150–400)
PMV BLD AUTO: 10.3 FL (ref 8.9–12.9)
RBC # BLD AUTO: 3.45 M/UL (ref 3.8–5.2)
WBC # BLD AUTO: 7.8 K/UL (ref 3.6–11)

## 2021-09-02 PROCEDURE — 0502F SUBSEQUENT PRENATAL CARE: CPT | Performed by: ADVANCED PRACTICE MIDWIFE

## 2021-09-02 NOTE — PROGRESS NOTES
GFM  \"keeping her up at night\"  Using Sea Bands for Nausea, seems to be helping  Ryan today  Plans to get Covid-19 vaccine this week

## 2021-10-04 ENCOUNTER — ROUTINE PRENATAL (OUTPATIENT)
Dept: OBGYN CLINIC | Age: 26
End: 2021-10-04
Payer: COMMERCIAL

## 2021-10-04 VITALS — WEIGHT: 143 LBS | DIASTOLIC BLOOD PRESSURE: 80 MMHG | BODY MASS INDEX: 24.55 KG/M2 | SYSTOLIC BLOOD PRESSURE: 118 MMHG

## 2021-10-04 DIAGNOSIS — Z3A.29 29 WEEKS GESTATION OF PREGNANCY: ICD-10-CM

## 2021-10-04 DIAGNOSIS — Z34.83 PRENATAL CARE, SUBSEQUENT PREGNANCY IN THIRD TRIMESTER: Primary | ICD-10-CM

## 2021-10-04 PROCEDURE — 0502F SUBSEQUENT PRENATAL CARE: CPT | Performed by: ADVANCED PRACTICE MIDWIFE

## 2021-10-19 ENCOUNTER — ROUTINE PRENATAL (OUTPATIENT)
Dept: OBGYN CLINIC | Age: 26
End: 2021-10-19
Payer: COMMERCIAL

## 2021-10-19 VITALS
WEIGHT: 144 LBS | HEIGHT: 64 IN | DIASTOLIC BLOOD PRESSURE: 70 MMHG | SYSTOLIC BLOOD PRESSURE: 124 MMHG | BODY MASS INDEX: 24.59 KG/M2

## 2021-10-19 DIAGNOSIS — Z3A.08 8 WEEKS GESTATION OF PREGNANCY: ICD-10-CM

## 2021-10-19 PROCEDURE — 0502F SUBSEQUENT PRENATAL CARE: CPT | Performed by: ADVANCED PRACTICE MIDWIFE

## 2021-10-19 NOTE — PROGRESS NOTES
Doing well.  GFM  C/o  hemorroids - discussed OTC - hand out given pt will try if this does not improve she will let us know   MANUELA 2 week

## 2021-11-15 ENCOUNTER — TELEPHONE (OUTPATIENT)
Dept: OBGYN CLINIC | Age: 26
End: 2021-11-15

## 2021-11-15 NOTE — TELEPHONE ENCOUNTER
You can add her on to Izzy's schedule Thursday or Friday, I don't have anything available this week. Or she can go to [facility].    Regina Monzon

## 2021-11-15 NOTE — TELEPHONE ENCOUNTER
This RN advised pt's  of KISHAM recommendations and appt with Ashley Pinedo CNM on 11/18. Pt's  verbalized understanding.

## 2021-11-15 NOTE — TELEPHONE ENCOUNTER
35w    Pt's  calling with c/o abd pain that is on the right side of belly button. Pt states she has had it for a week. Pt denies any VB, SROM, ctx, and difficulty urinating. Pt reports +FM. Pt has appt on , but doesn't think she can wait until then.      ?OV

## 2021-11-18 ENCOUNTER — HOSPITAL ENCOUNTER (EMERGENCY)
Age: 26
Discharge: HOME | End: 2021-11-18
Attending: OBSTETRICS & GYNECOLOGY | Admitting: OBSTETRICS & GYNECOLOGY
Payer: COMMERCIAL

## 2021-11-18 VITALS
TEMPERATURE: 98.3 F | DIASTOLIC BLOOD PRESSURE: 79 MMHG | RESPIRATION RATE: 17 BRPM | HEART RATE: 89 BPM | SYSTOLIC BLOOD PRESSURE: 123 MMHG | BODY MASS INDEX: 25.61 KG/M2 | HEIGHT: 64 IN | WEIGHT: 150 LBS

## 2021-11-18 PROCEDURE — 99283 EMERGENCY DEPT VISIT LOW MDM: CPT

## 2021-11-18 RX ORDER — TERBUTALINE SULFATE 1 MG/ML
0.25 INJECTION SUBCUTANEOUS AS NEEDED
Status: DISCONTINUED | OUTPATIENT
Start: 2021-11-18 | End: 2021-11-18 | Stop reason: HOSPADM
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
## 2021-11-18 NOTE — H&P
HPI   Pt reports that her 4year old jumped on her abdomen and kicked her last night about midnight. She reports good fetal movement all day but came to the hospital because of some contractions. She had about 14 contractions over the course of the day. She denies vaginal bleeding and leaking. She has one spot to the right of her abdomen that is hurting but this started about 2 weeks ago. She notices it when bending. This has been an uncomplicated pregnancy. OB History        2    Para   1    Term   1            AB        Living   1       SAB        IAB        Ectopic        Molar        Multiple   0    Live Births   1                                                                         Family History   Problem Relation Age of Onset    Diabetes Maternal Grandmother      Cancer Paternal Grandfather         brain    Cancer Paternal Grandmother         lung      Social History           Occupational History    student             Social History Main Topics    Smoking status: Never Smoker    Smokeless tobacco: Never Used    Alcohol use No    Drug use: No    Sexual activity: No                 Prior to Admission medications    Medication Sig Start Date End Date Taking?  Authorizing Provider   escitalopram oxalate (LEXAPRO) 10 mg tablet Take 10 mg by mouth daily.     Yes Historical Provider   multivitamin (ONE A DAY) tablet Take 1 Tab by mouth daily.     Yes Historical Provider   tretinoin, emollient, 0.05 % topical cream Apply  to affected area nightly.     Yes Historical Provider      No Known Allergies     Review of Systems: History obtained from the patient  Constitutional: negative for weight loss, fever, night sweats  HEENT: negative for hearing loss, earache, congestion, snoring, sorethroat  Resp: negative for cough, shortness of breath, wheezing  Breast: see above  GI: negative for change in bowel habits, black or bloody stools, positive for pain to right of umbilicus  : negative for frequency, dysuria, hematuria, vaginal discharge  MSK: negative for back pain, joint pain, muscle pain  Skin: negative for itching, rash, hives  Neuro: negative for dizziness, headache, confusion, weakness        Objective:       Visit Vitals    /86 (BP 1 Location: Left arm, BP Patient Position: Sitting)    Pulse 88    Temp 97.9 °F (36.6 °C) (Oral)    Resp 18    Ht 5' 10\" (1.778 m)    Wt 193 lb 12.8 oz (87.9 kg)    BMI 27.81 kg/m2      PHYSICAL EXAMINATION     Constitutional  · Appearance: well-nourished, well developed, alert, in no acute distress  · Abdomen:  No pain with palpation    , mod robert, +accels, -decels  No contractions    Cervical exam deferred     HAssessment/Plan:     at 35.3   Category 1 fht  No contractions    Fetus monitored for about 2 hours.     D/C to home  Return with decreased fetal movement, vaginal bleeding or leaking, or signs of labor                    Electronically signed by Ken Carrel, MD at 17 1025        Office Visit on 2017              Revision History              Detailed Report             Note shared with patient   N

## 2021-11-23 ENCOUNTER — ROUTINE PRENATAL (OUTPATIENT)
Dept: OBGYN CLINIC | Age: 26
End: 2021-11-23
Payer: COMMERCIAL

## 2021-11-23 VITALS
WEIGHT: 149 LBS | SYSTOLIC BLOOD PRESSURE: 104 MMHG | BODY MASS INDEX: 25.44 KG/M2 | DIASTOLIC BLOOD PRESSURE: 60 MMHG | HEIGHT: 64 IN

## 2021-11-23 DIAGNOSIS — Z34.83 PRENATAL CARE, SUBSEQUENT PREGNANCY IN THIRD TRIMESTER: Primary | ICD-10-CM

## 2021-11-23 DIAGNOSIS — Z36.85 ANTENATAL SCREENING FOR STREPTOCOCCUS B: ICD-10-CM

## 2021-11-23 DIAGNOSIS — Z3A.08 8 WEEKS GESTATION OF PREGNANCY: ICD-10-CM

## 2021-11-23 LAB — GRBS, EXTERNAL: NORMAL

## 2021-11-23 PROCEDURE — 0502F SUBSEQUENT PRENATAL CARE: CPT | Performed by: ADVANCED PRACTICE MIDWIFE

## 2021-11-25 LAB
GP B STREP DNA SPEC QL NAA+PROBE: NEGATIVE
SPECIMEN SOURCE: NORMAL

## 2021-12-02 NOTE — PATIENT INSTRUCTIONS
Week 40 of Your Pregnancy: Care Instructions  Overview     You are near the end of your pregnancy--and you're probably pretty uncomfortable. It may be harder to walk around. Lying down probably isn't comfortable either. You may have trouble getting to sleep or staying asleep. Most babies are born between 40 and 41 weeks. This is a good time to think about packing a bag for the hospital with items you'll need. Then you'll be ready when labor starts. Follow-up care is a key part of your treatment and safety. Be sure to make and go to all appointments, and call your doctor if you are having problems. It's also a good idea to know your test results and keep a list of the medicines you take. How can you care for yourself at home? Learn about breastfeeding  · Breastfeeding is best for your baby and good for you. · Breast milk has antibodies to help your baby fight infections. · If you breastfeed, you may lose weight faster. That's because making milk burns calories. · Learning the best ways to hold your baby will make breastfeeding easier. · Sometimes breastfeeding can make partners feel left out. If you have a partner, plan how you can care for your baby together. For example, your partner can bathe and diaper the baby. You can snuggle together when you breastfeed. · You may want to learn how to use a breast pump and store your milk. · If you choose to bottle feed, make the feeding feel like breastfeeding so you can bond with your baby. Always hold your baby and the bottle. Don't prop bottles or let your baby fall asleep with a bottle. Learn about crying  · It's common for babies to cry for 1 to 3 hours a day. Some cry more, and some cry less. · Babies don't cry to make you upset or because you're a bad parent. · Crying is how your baby communicates. Your baby may be hungry; have gas; need a diaper change; or feel cold, warm, tired, lonely, or tense. Sometimes babies cry for unknown reasons.   · If you respond to your baby's needs, your baby will learn to trust you. · Try to stay calm when your baby cries. Your baby may get more upset if they sense that you are upset. Know how to care for your   · Your baby's umbilical cord stump will drop off on its own, usually between 1 and 2 weeks. To care for your baby's umbilical cord area:  ? Clean the area at the bottom of the cord 2 or 3 times a day. ? Pay special attention to the area where the cord attaches to the skin. ? Keep the diaper folded below the cord. ? Use a damp washcloth or cotton ball to sponge bathe your baby until the stump has come off. · Your baby's first dark stool is called meconium. After the meconium is passed, your baby will develop their own bowel pattern. ? Some babies, especially  babies, have several bowel movements a day. Others have one or two a day, or one every 2 to 3 days. ?  babies often have loose, yellow stools. Formula-fed babies have more formed stools. ? If your baby's stools look like little pellets, your baby is constipated. After 2 days of constipation, call your baby's doctor. · If your baby will be circumcised, you can care for your baby at home. ? Gently rinse your baby's penis with warm water after every diaper change. Don't try to remove the film that forms on the penis. This film will go away on its own. Pat dry. ? Put petroleum ointment, such as Vaseline, on the area of the diaper that will touch your baby's penis. This will keep the diaper from sticking to your baby. ? Ask the doctor about giving your baby acetaminophen (Tylenol) for pain. Where can you learn more? Go to http://www.gray.com/  Enter N257 in the search box to learn more about \"Week 37 of Your Pregnancy: Care Instructions. \"  Current as of: 2021               Content Version: 13.0  © 0361-7215 Healthwise, Incorporated.    Care instructions adapted under license by [employer] (which disclaims liability or warranty for this information). If you have questions about a medical condition or this instruction, always ask your healthcare professional. [address] any warranty or liability for your use of this information.

## 2021-12-03 ENCOUNTER — ROUTINE PRENATAL (OUTPATIENT)
Dept: OBGYN CLINIC | Age: 26
End: 2021-12-03
Payer: COMMERCIAL

## 2021-12-03 VITALS — DIASTOLIC BLOOD PRESSURE: 78 MMHG | SYSTOLIC BLOOD PRESSURE: 122 MMHG | WEIGHT: 152 LBS | BODY MASS INDEX: 26.09 KG/M2

## 2021-12-03 DIAGNOSIS — Z34.83 PRENATAL CARE, SUBSEQUENT PREGNANCY IN THIRD TRIMESTER: Primary | ICD-10-CM

## 2021-12-03 PROCEDURE — 0502F SUBSEQUENT PRENATAL CARE: CPT | Performed by: MIDWIFE

## 2021-12-03 RX ORDER — FAMOTIDINE 20 MG/1
20 TABLET, FILM COATED ORAL 2 TIMES DAILY
COMMUNITY
End: 2021-12-26

## 2021-12-03 NOTE — PROGRESS NOTES
Active FM  Episode of [facility] Wednesday night  Started pepcid for GERD  GBS negative  Pain on right side of umbilicus. Reviewed diastasis recti.   Ready for baby  MANUELA in 1 weeks

## 2021-12-17 ENCOUNTER — ROUTINE PRENATAL (OUTPATIENT)
Dept: OBGYN CLINIC | Age: 26
End: 2021-12-17
Payer: COMMERCIAL

## 2021-12-17 VITALS — DIASTOLIC BLOOD PRESSURE: 82 MMHG | BODY MASS INDEX: 26.26 KG/M2 | WEIGHT: 153 LBS | SYSTOLIC BLOOD PRESSURE: 130 MMHG

## 2021-12-17 DIAGNOSIS — Z34.83 PRENATAL CARE, SUBSEQUENT PREGNANCY IN THIRD TRIMESTER: Primary | ICD-10-CM

## 2021-12-17 PROCEDURE — 0502F SUBSEQUENT PRENATAL CARE: CPT | Performed by: ADVANCED PRACTICE MIDWIFE

## 2021-12-17 NOTE — PATIENT INSTRUCTIONS
Week 39 of Your Pregnancy: Care Instructions  Your Care Instructions     During these final weeks, you may feel anxious to see your new baby. [place] babies often look different from what you see in pictures or movies. Right after birth, their heads may have a strange shape. Their eyes may be puffy. And their genitals may be swollen. They may also have very dry skin, or red marks on the eyelids, nose, or neck. Still, most parents think their babies are beautiful. Follow-up care is a key part of your treatment and safety. Be sure to make and go to all appointments, and call your doctor if you are having problems. It's also a good idea to know your test results and keep a list of the medicines you take. How can you care for yourself at home? Prepare to breastfeed  · If you are breastfeeding, continue to eat healthy foods. · If your health care provider recommends it, keep taking your prenatal vitamins. · Talk to your doctor before you take any medicine or supplement. Some medicines can affect your breast milk. · You can help prevent sore nipples if you feed your baby in the correct position. Nurses will help you learn to do this. Choose the right birth control after your baby is born  · If you are breastfeeding, you can still get pregnant. Use birth control if you don't want to get pregnant. · Intrauterine devices (IUDs) are very effective at preventing pregnancy and can provide birth control for 3 to 10 years, depending on the type. If you talk with your doctor before you have your baby, the IUD can be placed right after you give birth. If you decide you want to get pregnant later, you can have it removed. IUDs are safe to use while you are breastfeeding. · A hormonal implant is also very effective at preventing pregnancy. It is placed under the skin of the arm. This can be done right after you give birth. It releases the hormone progestin and prevents pregnancy for about 3 years.  This can also be removed by a doctor at any time. It is safe to use while you are breastfeeding. · Depo-Provera can be used while you are breastfeeding. It's a shot you get every 3 months. · Birth control pills work well. But you need a different kind of pill for the first few weeks after you give birth. And when you start taking these pills, you need to make sure to use another type of birth control for 7 days after you start your pack. · Diaphragms, cervical caps, and condoms with spermicide work less well after birth. If you have a diaphragm or cervical cap, talk to your doctor to see if you need a different size. · Tubal ligation (tying your tubes) and vasectomy are both permanent. These are good options if you are sure you are done having children. Where can you learn more? Go to http://www.gray.com/  Enter A811 in the search box to learn more about \"Week 39 of Your Pregnancy: Care Instructions. \"  Current as of: June 16, 2021               Content Version: 13.0  © 2006-2021 Healthwise, Incorporated. Care instructions adapted under license by [employer] (which disclaims liability or warranty for this information). If you have questions about a medical condition or this instruction, always ask your healthcare professional. [address] any warranty or liability for your use of this information.

## 2021-12-17 NOTE — PROGRESS NOTES
Feeling very uncomfortable today  GERD, nausea, difficulty sleeping, BHC  Good FM  Reviewed SOL, [address] 1 week  Discussed IOL as an option- pt desires to wait for labor

## 2021-12-22 NOTE — PATIENT INSTRUCTIONS
Labor Induction: Care Instructions  Overview  If you pass your due date and your labor does not start on its own, your doctor may want to try to start (induce) labor. Your doctor may suggest doing this for other reasons. It may be a good idea to induce labor if you have another problem. For example, it may be done if you have high blood pressure. Or it may be a good idea if the placenta can no longer give enough support to the baby. There are several ways to induce labor, such as using medicine or breaking the amniotic sac. After you have your baby, you should not have any side effects from the medicine used to start labor. Follow-up care is a key part of your treatment and safety. Be sure to make and go to all appointments, and call your doctor if you are having problems. It's also a good idea to know your test results and keep a list of the medicines you take. When should your labor be induced? Labor induction may be done if labor doesn't start on its own. Labor may be induced when:  · Your pregnancy has gone 1 to 2 weeks past your expected due date. · You have a problem that may harm your health or the health of your baby if you continue to be pregnant. This includes high blood pressure, preeclampsia, and diabetes. · Your water breaks, but labor does not start. When should you call for help? Watch closely for changes in your health, and be sure to contact your doctor if you have questions about inducing labor. Where can you learn more? Go to http://www.gray.com/  Enter S819905 in the search box to learn more about \"Labor Induction: Care Instructions. \"  Current as of: June 16, 2021               Content Version: 13.0  © 1490-4478 Healthwise, Incorporated. Care instructions adapted under license by [employer] (which disclaims liability or warranty for this information).  If you have questions about a medical condition or this instruction, always ask your healthcare professional. [address] any warranty or liability for your use of this information.

## 2021-12-23 ENCOUNTER — ROUTINE PRENATAL (OUTPATIENT)
Dept: OBGYN CLINIC | Age: 26
End: 2021-12-23

## 2021-12-23 ENCOUNTER — ANESTHESIA (OUTPATIENT)
Dept: LABOR AND DELIVERY | Age: 26
DRG: 560 | End: 2021-12-23
Payer: COMMERCIAL

## 2021-12-23 ENCOUNTER — ANESTHESIA EVENT (OUTPATIENT)
Dept: LABOR AND DELIVERY | Age: 26
DRG: 560 | End: 2021-12-23
Payer: COMMERCIAL

## 2021-12-23 ENCOUNTER — HOSPITAL ENCOUNTER (INPATIENT)
Age: 26
LOS: 2 days | Discharge: HOME | DRG: 560 | End: 2021-12-25
Attending: OBSTETRICS & GYNECOLOGY | Admitting: MIDWIFE
Payer: COMMERCIAL

## 2021-12-23 VITALS — SYSTOLIC BLOOD PRESSURE: 106 MMHG | WEIGHT: 154 LBS | DIASTOLIC BLOOD PRESSURE: 72 MMHG | BODY MASS INDEX: 26.43 KG/M2

## 2021-12-23 DIAGNOSIS — Z3A.08 8 WEEKS GESTATION OF PREGNANCY: ICD-10-CM

## 2021-12-23 DIAGNOSIS — Z34.80 SUPERVISION OF OTHER NORMAL PREGNANCY: Primary | ICD-10-CM

## 2021-12-23 PROBLEM — Z34.90 PREGNANCY: Status: ACTIVE | Noted: 2021-12-23

## 2021-12-23 LAB
COVID-19 RAPID TEST, COVR: NOT DETECTED
ERYTHROCYTE [DISTWIDTH] IN BLOOD BY AUTOMATED COUNT: 13.1 % (ref 11.5–14.5)
HCT VFR BLD AUTO: 34.2 % (ref 35–47)
HGB BLD-MCNC: 11.2 G/DL (ref 11.5–16)
MCH RBC QN AUTO: 28.6 PG (ref 26–34)
MCHC RBC AUTO-ENTMCNC: 32.7 G/DL (ref 30–36.5)
MCV RBC AUTO: 87.5 FL (ref 80–99)
NRBC # BLD: 0 K/UL (ref 0–0.01)
NRBC BLD-RTO: 0 PER 100 WBC
PLATELET # BLD AUTO: 224 K/UL (ref 150–400)
PMV BLD AUTO: 10 FL (ref 8.9–12.9)
RBC # BLD AUTO: 3.91 M/UL (ref 3.8–5.2)
SARS-COV-2, COV2: NORMAL
SOURCE, COVRS: NORMAL
WBC # BLD AUTO: 9.6 K/UL (ref 3.6–11)

## 2021-12-23 PROCEDURE — 85027 COMPLETE CBC AUTOMATED: CPT

## 2021-12-23 PROCEDURE — 74011250636 HC RX REV CODE- 250/636: Performed by: MIDWIFE

## 2021-12-23 PROCEDURE — 0502F SUBSEQUENT PRENATAL CARE: CPT | Performed by: ADVANCED PRACTICE MIDWIFE

## 2021-12-23 PROCEDURE — 74011250636 HC RX REV CODE- 250/636: Performed by: ANESTHESIOLOGY

## 2021-12-23 PROCEDURE — 87635 SARS-COV-2 COVID-19 AMP PRB: CPT

## 2021-12-23 PROCEDURE — 75410000002 HC LABOR FEE PER 1 HR

## 2021-12-23 PROCEDURE — 75410000000 HC DELIVERY VAGINAL/SINGLE

## 2021-12-23 PROCEDURE — 74011000250 HC RX REV CODE- 250: Performed by: ANESTHESIOLOGY

## 2021-12-23 PROCEDURE — 65270000029 HC RM PRIVATE

## 2021-12-23 PROCEDURE — 00HU33Z INSERTION OF INFUSION DEVICE INTO SPINAL CANAL, PERCUTANEOUS APPROACH: ICD-10-PCS | Performed by: ANESTHESIOLOGY

## 2021-12-23 PROCEDURE — 75410000003 HC RECOV DEL/VAG/CSECN EA 0.5 HR

## 2021-12-23 PROCEDURE — 36415 COLL VENOUS BLD VENIPUNCTURE: CPT

## 2021-12-23 PROCEDURE — 74011250636 HC RX REV CODE- 250/636

## 2021-12-23 PROCEDURE — 76060000078 HC EPIDURAL ANESTHESIA

## 2021-12-23 RX ORDER — DIPHENHYDRAMINE HYDROCHLORIDE 50 MG/ML
12.5 INJECTION, SOLUTION INTRAMUSCULAR; INTRAVENOUS
Status: DISCONTINUED | OUTPATIENT
Start: 2021-12-23 | End: 2021-12-24 | Stop reason: HOSPADM

## 2021-12-23 RX ORDER — FENTANYL CITRATE 50 UG/ML
INJECTION, SOLUTION INTRAMUSCULAR; INTRAVENOUS
Status: COMPLETED
Start: 2021-12-23 | End: 2021-12-23

## 2021-12-23 RX ORDER — FENTANYL CITRATE 50 UG/ML
100 INJECTION, SOLUTION INTRAMUSCULAR; INTRAVENOUS ONCE
Status: DISCONTINUED | OUTPATIENT
Start: 2021-12-24 | End: 2021-12-24 | Stop reason: HOSPADM

## 2021-12-23 RX ORDER — FENTANYL CITRATE 50 UG/ML
INJECTION, SOLUTION INTRAMUSCULAR; INTRAVENOUS AS NEEDED
Status: DISCONTINUED | OUTPATIENT
Start: 2021-12-23 | End: 2021-12-24 | Stop reason: HOSPADM

## 2021-12-23 RX ORDER — OXYTOCIN/RINGER'S LACTATE 30/500 ML
10 PLASTIC BAG, INJECTION (ML) INTRAVENOUS AS NEEDED
Status: COMPLETED | OUTPATIENT
Start: 2021-12-23 | End: 2021-12-24

## 2021-12-23 RX ORDER — BUPIVACAINE HYDROCHLORIDE 5 MG/ML
INJECTION, SOLUTION EPIDURAL; INTRACAUDAL AS NEEDED
Status: DISCONTINUED | OUTPATIENT
Start: 2021-12-23 | End: 2021-12-24 | Stop reason: HOSPADM

## 2021-12-23 RX ORDER — BUTORPHANOL TARTRATE 1 MG/ML
1 INJECTION INTRAMUSCULAR; INTRAVENOUS
Status: DISCONTINUED | OUTPATIENT
Start: 2021-12-23 | End: 2021-12-26 | Stop reason: HOSPADM

## 2021-12-23 RX ORDER — OXYTOCIN/RINGER'S LACTATE 30/500 ML
87.3 PLASTIC BAG, INJECTION (ML) INTRAVENOUS AS NEEDED
Status: COMPLETED | OUTPATIENT
Start: 2021-12-23 | End: 2021-12-24

## 2021-12-23 RX ORDER — NALOXONE HYDROCHLORIDE 0.4 MG/ML
0.4 INJECTION, SOLUTION INTRAMUSCULAR; INTRAVENOUS; SUBCUTANEOUS AS NEEDED
Status: DISCONTINUED | OUTPATIENT
Start: 2021-12-23 | End: 2021-12-24 | Stop reason: HOSPADM

## 2021-12-23 RX ORDER — SODIUM CHLORIDE 0.9 % (FLUSH) 0.9 %
5-40 SYRINGE (ML) INJECTION AS NEEDED
Status: DISCONTINUED | OUTPATIENT
Start: 2021-12-23 | End: 2021-12-26 | Stop reason: HOSPADM

## 2021-12-23 RX ORDER — EPHEDRINE SULFATE/0.9% NACL/PF 50 MG/5 ML
SYRINGE (ML) INTRAVENOUS
Status: DISPENSED
Start: 2021-12-23 | End: 2021-12-24

## 2021-12-23 RX ORDER — FENTANYL/BUPIVACAINE/NS/PF 2-1250MCG
1-16 PREFILLED PUMP RESERVOIR EPIDURAL CONTINUOUS
Status: DISCONTINUED | OUTPATIENT
Start: 2021-12-24 | End: 2021-12-24 | Stop reason: HOSPADM

## 2021-12-23 RX ORDER — SODIUM CHLORIDE, SODIUM LACTATE, POTASSIUM CHLORIDE, CALCIUM CHLORIDE 600; 310; 30; 20 MG/100ML; MG/100ML; MG/100ML; MG/100ML
125 INJECTION, SOLUTION INTRAVENOUS CONTINUOUS
Status: DISCONTINUED | OUTPATIENT
Start: 2021-12-23 | End: 2021-12-26 | Stop reason: HOSPADM

## 2021-12-23 RX ORDER — LIDOCAINE HYDROCHLORIDE 10 MG/ML
10 INJECTION INFILTRATION; PERINEURAL AS NEEDED
Status: DISCONTINUED | OUTPATIENT
Start: 2021-12-23 | End: 2021-12-24 | Stop reason: HOSPADM

## 2021-12-23 RX ORDER — OXYTOCIN/RINGER'S LACTATE 30/500 ML
PLASTIC BAG, INJECTION (ML) INTRAVENOUS
Status: COMPLETED
Start: 2021-12-23 | End: 2021-12-23

## 2021-12-23 RX ORDER — OXYTOCIN/RINGER'S LACTATE 30/500 ML
0-20 PLASTIC BAG, INJECTION (ML) INTRAVENOUS
Status: DISCONTINUED | OUTPATIENT
Start: 2021-12-23 | End: 2021-12-26 | Stop reason: HOSPADM

## 2021-12-23 RX ORDER — EPHEDRINE SULFATE/0.9% NACL/PF 50 MG/5 ML
12.5 SYRINGE (ML) INTRAVENOUS
Status: DISCONTINUED | OUTPATIENT
Start: 2021-12-23 | End: 2021-12-24 | Stop reason: HOSPADM

## 2021-12-23 RX ORDER — MAG HYDROX/ALUMINUM HYD/SIMETH 200-200-20
30 SUSPENSION, ORAL (FINAL DOSE FORM) ORAL
Status: DISCONTINUED | OUTPATIENT
Start: 2021-12-23 | End: 2021-12-24 | Stop reason: HOSPADM

## 2021-12-23 RX ORDER — ONDANSETRON 2 MG/ML
4 INJECTION INTRAMUSCULAR; INTRAVENOUS
Status: DISCONTINUED | OUTPATIENT
Start: 2021-12-23 | End: 2021-12-24 | Stop reason: HOSPADM

## 2021-12-23 RX ORDER — SODIUM CHLORIDE 0.9 % (FLUSH) 0.9 %
5-40 SYRINGE (ML) INJECTION EVERY 8 HOURS
Status: DISCONTINUED | OUTPATIENT
Start: 2021-12-23 | End: 2021-12-25

## 2021-12-23 RX ORDER — BUPIVACAINE HYDROCHLORIDE 5 MG/ML
INJECTION, SOLUTION EPIDURAL; INTRACAUDAL
Status: COMPLETED
Start: 2021-12-23 | End: 2021-12-23

## 2021-12-23 RX ORDER — OXYTOCIN/RINGER'S LACTATE 30/500 ML
1-25 PLASTIC BAG, INJECTION (ML) INTRAVENOUS
Status: DISCONTINUED | OUTPATIENT
Start: 2021-12-23 | End: 2021-12-26 | Stop reason: HOSPADM

## 2021-12-23 RX ORDER — BUPIVACAINE HYDROCHLORIDE 5 MG/ML
30 INJECTION, SOLUTION EPIDURAL; INTRACAUDAL AS NEEDED
Status: DISCONTINUED | OUTPATIENT
Start: 2021-12-23 | End: 2021-12-24 | Stop reason: HOSPADM

## 2021-12-23 RX ADMIN — FENTANYL CITRATE 2 MCG: 50 INJECTION, SOLUTION INTRAMUSCULAR; INTRAVENOUS at 23:37

## 2021-12-23 RX ADMIN — LIDOCAINE HYDROCHLORIDE 3 MG: 10; .005 INJECTION, SOLUTION EPIDURAL; INFILTRATION; INTRACAUDAL; PERINEURAL at 23:36

## 2021-12-23 RX ADMIN — LIDOCAINE HYDROCHLORIDE 2 MG: 10; .005 INJECTION, SOLUTION EPIDURAL; INFILTRATION; INTRACAUDAL; PERINEURAL at 23:34

## 2021-12-23 RX ADMIN — Medication 10 ML/HR: at 23:53

## 2021-12-23 RX ADMIN — BUPIVACAINE HYDROCHLORIDE 2 ML: 5 INJECTION, SOLUTION EPIDURAL; INTRACAUDAL; PERINEURAL at 23:38

## 2021-12-23 RX ADMIN — Medication 2 MILLI-UNITS/MIN: at 18:22

## 2021-12-23 RX ADMIN — SODIUM CHLORIDE, POTASSIUM CHLORIDE, SODIUM LACTATE AND CALCIUM CHLORIDE 125 ML/HR: 600; 310; 30; 20 INJECTION, SOLUTION INTRAVENOUS at 18:23

## 2021-12-23 RX ADMIN — OXYTOCIN 2 MILLI-UNITS/MIN: 10 INJECTION INTRAVENOUS at 18:22

## 2021-12-23 RX ADMIN — BUTORPHANOL TARTRATE 1 MG: 1 INJECTION, SOLUTION INTRAMUSCULAR; INTRAVENOUS at 21:09

## 2021-12-23 RX ADMIN — BUPIVACAINE HYDROCHLORIDE 2 ML: 5 INJECTION, SOLUTION EPIDURAL; INTRACAUDAL; PERINEURAL at 23:37

## 2021-12-23 RX ADMIN — FENTANYL CITRATE 100 MCG: 50 INJECTION, SOLUTION INTRAMUSCULAR; INTRAVENOUS at 23:35

## 2021-12-23 NOTE — PROGRESS NOTES
Doing well   Reviewed US with pt and     IOL today at 4 pm    US today:    LIMITED OB SCAN  A SINGLE VERTEX 40W3D IUP IS SEEN. FETAL CARDIAC MOTION OBSERVED. LIMITED ANATOMY WAS VISUALIZED AND APPEARS WNL. THERE APPEARS TO BE A HYPERECHOIC BOWEL  SEEN IN THE LARGE ASCENDING AND TRANSVERSE LARGE INTESTINES.  EFW= 7LB 8OZ ( 42 %)  BPP=8/8  CRESENCIO= 9.8 CM  PLACENTA APPEAR WITHIN NORMAL LIMITS.

## 2021-12-23 NOTE — PROGRESS NOTES
12/23/2021  3:59 PM  Pt arrived to unit for scheduled induction. Pt endorses positive fetal movement, denies LOF/VB. 1703: Masoud Nunez CNM at bedside. SVE 4/80/-3  Membrane sweep  Plan to induce with pitocin  1905:  SROM clear fluid  1906:  RN called Masoud Nunez CNM to report pt condition. Ok to turn pitocin off. Will resume pitocin if ctx space out  1948: Bedside and Verbal shift change report given to Karissa Maravilla (oncoming nurse) by Janae Brunner RN (offgoing nurse). Report included the following information SBAR, Kardex, Intake/Output, MAR and Recent Results.

## 2021-12-23 NOTE — H&P
History & Physical    Name: Jorge L Yang MRN: [id number]  SSN: [id number]    YOB: 1995  Age: 23 y.o. Sex: female        Subjective:     Estimated Date of Delivery: 21  OB History    Para Term  AB Living   2 1 1     1   SAB IAB Ectopic Molar Multiple Live Births           0 1      # Outcome Date GA Lbr Chuy/2nd Weight Sex Delivery Anes PTL Lv   2 Current            1 Term 19 39w1d 28:47 / 00:55 7 lb 0.7 oz (3.195 kg) M Vag-Spont EPI N [place]       Ms. Kash Champlin is admitted with pregnancy at 40w3d for induction of labor. Prenatal course was normal. Please see prenatal records for details. Provider: Kristina Riley P1  EDC by LMP  Pertinents:  hemorrhoids recommend OTC     Pt insisting on US to see baby often- no medical reason - growth very appropriate - reviewed this with pt and gave info to [facility] location      IOB labs:   Genetic Screening: Declines  Anatomy:Ant Placenta, 3VC, Male  GTT: 80,  H/H: 10/31  Flu:   TDAP:  Rhogam: Bpos  Third Tri Labs: Hgb 10.3  GBS: negative   COVID:     Pain mgmt. in labor: Try low intervention   Feeding: Breast  Circ: Yes  Social:  - John  Son- Angela Simpson (sounds like I-on) Recently dx with ASD  Isa Ruvalcaba caught baby for Dougal- liked their experience and wanted consistancy in care with midwife  30 hr labor    No past medical history on file. No past surgical history on file. Social History     Occupational History    Not on file   Tobacco Use    Smoking status: Never Smoker    Smokeless tobacco: Never Used   Substance and Sexual Activity    Alcohol use: Never    Drug use: Never    Sexual activity: Yes     Partners: Male     Birth control/protection: None     No family history on file. No Known Allergies  Prior to Admission medications    Medication Sig Start Date End Date Taking? Authorizing Provider   famotidine (Pepcid) 20 mg tablet Take 20 mg by mouth two (2) times a day.     Provider, Historical   PNV no.95/ferrous fum/folic ac (PRENATAL PO) Take  by mouth. Provider, Historical   ondansetron (ZOFRAN ODT) 4 mg disintegrating tablet Take 1 Tab by mouth every eight (8) hours as needed for Nausea or Vomiting. Patient not taking: Reported on 2021   Lalo Yepez CNM   doxylamine-pyridoxine, vit B6, (Bonjesta) 20-20 mg TbID Take 1 Tab by mouth two (2) times a day. Patient not taking: Reported on 2021   Lalo Yepez CNM        Review of Systems: A comprehensive review of systems was negative except for that written in the HPI. Objective:     Vitals:  Vitals:    21 1604   BP: 121/72   Pulse: 88   Resp: 14   Temp: 97.9 °F (36.6 °C)   SpO2: 97%        Physical Exam:  Patient without distress. Abdomen: soft, nontender  Fundus: soft and non tender  Perineum: blood absent, amniotic fluid absent  Cervical Exam: 4 cm dilated    80% effaced    -3 station    Presenting Part: cephalic  Cervical Position: posterior  Consistency: Medium  Lower Extremities:  - Edema No  Membranes:  Intact  Fetal Heart Rate: Reactive  Baseline: 130 per minute  Variability: moderate  Accelerations: yes  Decelerations: none  Uterine contractions: none    Prenatal Labs:   Lab Results   Component Value Date/Time    ABO/Rh(D) B POSITIVE 2021 01:51 PM    Rubella, External IMMUNE 10/22/2018 12:00 AM    GrBStrep, External POSITIVE 2019 12:00 AM    HBsAg, External NEGATIVE 10/22/2018 12:00 AM    HIV, External NEGATIVE 10/22/2018 12:00 AM    RPR, External Non-reactive 10/22/2018 12:00 AM    ABO,Rh B POS 10/22/2018 12:00 AM         Assessment/Plan:     Active Problems:    Pregnancy (2021)     Elective IOL   @ 40.3 weeks GA  GBS neg  Thompson score 6  Start pitocin   Epidural per pt request    Plan: Admit for Reassuring fetal status.

## 2021-12-24 LAB
ERYTHROCYTE [DISTWIDTH] IN BLOOD BY AUTOMATED COUNT: 12.9 % (ref 11.5–14.5)
HCT VFR BLD AUTO: 31.3 % (ref 35–47)
HGB BLD-MCNC: 10.1 G/DL (ref 11.5–16)
MCH RBC QN AUTO: 28.8 PG (ref 26–34)
MCHC RBC AUTO-ENTMCNC: 32.3 G/DL (ref 30–36.5)
MCV RBC AUTO: 89.2 FL (ref 80–99)
NRBC # BLD: 0 K/UL (ref 0–0.01)
NRBC BLD-RTO: 0 PER 100 WBC
PLATELET # BLD AUTO: 183 K/UL (ref 150–400)
PMV BLD AUTO: 10.4 FL (ref 8.9–12.9)
RBC # BLD AUTO: 3.51 M/UL (ref 3.8–5.2)
WBC # BLD AUTO: 13.1 K/UL (ref 3.6–11)

## 2021-12-24 PROCEDURE — 36415 COLL VENOUS BLD VENIPUNCTURE: CPT

## 2021-12-24 PROCEDURE — 74011250637 HC RX REV CODE- 250/637: Performed by: MIDWIFE

## 2021-12-24 PROCEDURE — 3E033VJ INTRODUCTION OF OTHER HORMONE INTO PERIPHERAL VEIN, PERCUTANEOUS APPROACH: ICD-10-PCS | Performed by: OBSTETRICS & GYNECOLOGY

## 2021-12-24 PROCEDURE — 65410000002 HC RM PRIVATE OB

## 2021-12-24 PROCEDURE — 75410000003 HC RECOV DEL/VAG/CSECN EA 0.5 HR

## 2021-12-24 PROCEDURE — 74011250636 HC RX REV CODE- 250/636: Performed by: MIDWIFE

## 2021-12-24 PROCEDURE — 85027 COMPLETE CBC AUTOMATED: CPT

## 2021-12-24 RX ORDER — DOCUSATE SODIUM 100 MG/1
100 CAPSULE, LIQUID FILLED ORAL
Status: DISCONTINUED | OUTPATIENT
Start: 2021-12-24 | End: 2021-12-26 | Stop reason: HOSPADM

## 2021-12-24 RX ORDER — MISOPROSTOL 200 UG/1
800 TABLET ORAL ONCE
Status: COMPLETED | OUTPATIENT
Start: 2021-12-24 | End: 2021-12-24

## 2021-12-24 RX ORDER — IBUPROFEN 400 MG/1
800 TABLET ORAL EVERY 8 HOURS
Status: DISCONTINUED | OUTPATIENT
Start: 2021-12-24 | End: 2021-12-26 | Stop reason: HOSPADM

## 2021-12-24 RX ORDER — ACETAMINOPHEN 325 MG/1
650 TABLET ORAL
Status: DISCONTINUED | OUTPATIENT
Start: 2021-12-24 | End: 2021-12-26 | Stop reason: HOSPADM

## 2021-12-24 RX ORDER — NALOXONE HYDROCHLORIDE 0.4 MG/ML
0.4 INJECTION, SOLUTION INTRAMUSCULAR; INTRAVENOUS; SUBCUTANEOUS AS NEEDED
Status: DISCONTINUED | OUTPATIENT
Start: 2021-12-24 | End: 2021-12-26 | Stop reason: HOSPADM

## 2021-12-24 RX ORDER — OXYTOCIN/RINGER'S LACTATE 30/500 ML
PLASTIC BAG, INJECTION (ML) INTRAVENOUS
Status: DISPENSED
Start: 2021-12-24 | End: 2021-12-24

## 2021-12-24 RX ORDER — HYDROCODONE BITARTRATE AND ACETAMINOPHEN 5; 325 MG/1; MG/1
1 TABLET ORAL
Status: DISCONTINUED | OUTPATIENT
Start: 2021-12-24 | End: 2021-12-26 | Stop reason: HOSPADM

## 2021-12-24 RX ORDER — MISOPROSTOL 200 UG/1
TABLET ORAL
Status: DISPENSED
Start: 2021-12-24 | End: 2021-12-24

## 2021-12-24 RX ORDER — HYDROCORTISONE ACETATE PRAMOXINE HCL 2.5; 1 G/100G; G/100G
CREAM TOPICAL AS NEEDED
Status: DISCONTINUED | OUTPATIENT
Start: 2021-12-24 | End: 2021-12-26 | Stop reason: HOSPADM

## 2021-12-24 RX ADMIN — OXYTOCIN 87.3 MILLI-UNITS/MIN: 10 INJECTION INTRAVENOUS at 03:50

## 2021-12-24 RX ADMIN — SODIUM CHLORIDE, POTASSIUM CHLORIDE, SODIUM LACTATE AND CALCIUM CHLORIDE 999 ML/HR: 600; 310; 30; 20 INJECTION, SOLUTION INTRAVENOUS at 06:18

## 2021-12-24 RX ADMIN — SODIUM CHLORIDE, POTASSIUM CHLORIDE, SODIUM LACTATE AND CALCIUM CHLORIDE 125 ML/HR: 600; 310; 30; 20 INJECTION, SOLUTION INTRAVENOUS at 00:55

## 2021-12-24 RX ADMIN — MISOPROSTOL 800 MCG: 200 TABLET ORAL at 06:20

## 2021-12-24 RX ADMIN — IBUPROFEN 800 MG: 400 TABLET, FILM COATED ORAL at 06:19

## 2021-12-24 RX ADMIN — IBUPROFEN 800 MG: 400 TABLET, FILM COATED ORAL at 15:50

## 2021-12-24 RX ADMIN — ACETAMINOPHEN 650 MG: 325 TABLET ORAL at 13:38

## 2021-12-24 RX ADMIN — IBUPROFEN 800 MG: 400 TABLET, FILM COATED ORAL at 23:53

## 2021-12-24 RX ADMIN — OXYTOCIN 10000 MILLI-UNITS: 10 INJECTION INTRAVENOUS at 06:18

## 2021-12-24 NOTE — ROUTINE PROCESS
1600-  Bedside shift change report given to BARBIE Miller RN (oncoming nurse) by Ganesh Ryder RN (offgoing nurse). Report included the following information SBAR.

## 2021-12-24 NOTE — ANESTHESIA POSTPROCEDURE EVALUATION
Post-Anesthesia Evaluation and Assessment    Patient: Wes Lee MRN: [id number]  SSN: [id number]    YOB: 1995  Age: 23 y.o. Sex: female      I have evaluated the patient and they are stable and ready for discharge from the PACU. Cardiovascular Function/Vital Signs  Visit Vitals  /76 (BP 1 Location: Right upper arm, BP Patient Position: At rest)   Pulse 97   Temp 36.6 °C (97.9 °F)   Resp 16   Ht 5' 4\" (1.626 m)   Wt 69.9 kg (154 lb)   SpO2 97%   Breastfeeding Unknown   BMI 26.43 kg/m²       Patient is status post * No anesthesia type entered * anesthesia for * No procedures listed *. Nausea/Vomiting: None    Postoperative hydration reviewed and adequate. Pain:  Pain Scale 1: Numeric (0 - 10) (12/24/21 0555)  Pain Intensity 1: 9 (12/24/21 0555)   Managed    Neurological Status:   Neuro (WDL): Within Defined Limits (12/24/21 0352)   At baseline    Mental Status, Level of Consciousness: Alert and  oriented to person, place, and time    Pulmonary Status:   O2 Device: None (Room air) (12/24/21 0555)   Adequate oxygenation and airway patent    Complications related to anesthesia: None    Post-anesthesia assessment completed. No concerns    Signed By: Leone Boast, MD     December 24, 2021              * No procedures listed *.    epidural    <BSHSIANPOST>    INITIAL Post-op Vital signs: No vitals data found for the desired time range.

## 2021-12-24 NOTE — ANESTHESIA PROCEDURE NOTES
Epidural Block    Patient location during procedure: OB  Start time: 12/23/2021 11:24 PM  End time: 12/23/2021 11:38 PM  Reason for block: labor epidural  Staffing  Performed: attending   Preanesthetic Checklist  Completed: patient identified, IV checked, site marked, risks and benefits discussed, surgical consent, monitors and equipment checked, pre-op evaluation and timeout performed  Block Placement  Patient position: left lateral decubitus  Prep: DuraPrep  Sterility prep: cap, drape, gloves and mask  Sedation level: no sedation  Patient monitoring: continuous pulse oximetry and heart rate  Approach: midline  Location: lumbar  Lumbar location: L3-L4  Epidural  Loss of resistance technique: air  Guidance: landmark technique  Needle  Needle type: Tuohy   Needle gauge: 17 G  Needle length: 9 cm  Needle insertion depth: 7 cm  Catheter type: end hole  Catheter size: 19 G  Catheter at skin depth: 12 cm  Catheter securement method: clear occlusive dressing and surgical tape  Test dose: negative  Assessment  Sensory level: T10  Block outcome: pain improved  Number of attempts: 1  Procedure assessment: patient tolerated procedure well with no immediate complications

## 2021-12-24 NOTE — LACTATION NOTE
This note was copied from a baby's chart. Baby nursing well today,  deep latch obtained, mother is comfortable, baby feeding vigorously with rhythmic suck, swallow, breathe pattern, audible swallowing, and evident milk transfer, both breasts offered, baby is asleep following feeding. Mother was questioning if her colostrum is enough. We reviewed normal development of milk, baby's stomach size.

## 2021-12-24 NOTE — PROGRESS NOTES
TRANSFER - IN REPORT:    Verbal report received from Epi Taylor RN (name) on [facility]  being received from L&D (unit) for routine progression of care      Report consisted of patients Situation, Background, Assessment and   Recommendations(SBAR). Information from the following report(s) SBAR was reviewed with the receiving nurse. Opportunity for questions and clarification was provided. Assessment completed upon patients arrival to unit and care assumed. 12: RN performed fundal check. Pt was trickling a moderate amount of blood      0600: 200 mL Pitocin in LR bolused @ 999.     0605: 2nd RN Diogenes (MIU) at bedside, performed fundal check, pt still trickling moderate amount and passed 2 small clots. 1212: L&D RN Steve Mcdonald at bedside. Pt states that she feels \"dizzy\". 9385: 500 mL Pitocin in LR bolused @ 999. 1 L LR bolused @ 999. CBC drawn. 1736: 800 mcg cytotec given rectally. 5246: HGB resulted at 10.1.     0656: Vital signs stable. /76 HR 81. Small amount of bleeding. Pt states she is feeling better. 0700: Per L&D RN Yonas Rios was notified, but in the midst of a delivery. Verbal report given to Steve Mcdonald on orders given on MIU. Kyle gomez updated and no further orders given at this time. 9112: Hand off report given to Veronique Parker RN. Staff will continue to monitor.      Dianelys Sapp RN

## 2021-12-24 NOTE — L&D DELIVERY NOTE
CNM Delivery Note       Patient: Miranda Rockwell MRN: [id number]  SSN: [id number]    YOB: 1995  Age: 23 y.o. Sex: female        Complete cervical dilation at 0308.  of a live male infant at 1 with Apgars 8,9 in BOB. Mother was in semi fowlers position under epidural anesthesia. Shoulders spontaneous, easily delivered with maternal effort. Vigorous infant placed on maternal abdomen immediately following delivery. Cord clamped when pulsation stopped and then cut by FOB. Placenta and membranes spontaneous, intact, with 3 vessel cord via Daniel Fear mechanism at 0605. Fundus massaged to firm. Estimated blood loss to be determined (minimal). Vagina and perineum inspected. Perineum intact. Mother and infant stable, bonding, establishing breastfeeding.

## 2021-12-24 NOTE — PROGRESS NOTES
1948 Bedside report received from [employer].    2058 Javad Johnson CNMISAEL notified that patient is requesting stadol for pain 7/10. Anthony Benavidez updated on [address]. 2109 Stadol 1mg given. 2242 Patient states she is in pain again and would like the epidural. LR bolus started. 4214 [facility],[address] notified patient would like an epidural.    2324 Patient up to the bathroom. 2335 Epidural placed by Theo Saez MD.    02271 Cervical exam 7/90/-1.    0126 Pitocin restarted at 2mu.    0135 Pitocin stopped. LR Bolus given due to prolonged deceleration. 0138 Cervical exam 8/100/-1.    21 [address] updated on patient status, the prolonged decel, interventions, the d/c of pitocin, and the latest cervical exam.    0308 Cervical exam 10/100/+2.    [address] notified patient is complete/+2 station. 8528 Began pushing with Patient. 6681 Ger HAMMER at bedside. 8504 Patient delivered. 0055 Patient transferred to MIU. Bedside report given. 0855 MIU called to notify Robbin Valerio RN that patient is now boggy, and feeling dizzy. I advised I would inform Yousuf Whitaker CNM notified of change in patient PP status. Anthony Benavidez gave verbal order for 800mcg Cytotec rectally. 1591 800 Cytotec placed rectally. [address] updated on patient status. Uterus is now Firm Scant Bleeding 1 below U. 800mcg cytotec placed rectally, 2nd bag of Pitocin running, LR Bolus running and Stat CBC sent.

## 2021-12-24 NOTE — L&D DELIVERY NOTE
Delivery Summary    Patient: Rocio Rizvi MRN: [id number]  SSN: [id number]    YOB: 1995  Age: 23 y.o. Sex: female       Information for the patient's :  Ashlie Soares [[id number]]       Labor Events:    Labor: No    Steroids:     Cervical Ripening Date/Time:       Cervical Ripening Type:     Antibiotics During Labor:     Rupture Identifier:      Rupture Date/Time: 2021 7:05 PM   Rupture Type: SROM   Amniotic Fluid Volume: Moderate    Amniotic Fluid Description: Clear    Amniotic Fluid Odor: None    Induction: Membrane Stripping;Oxytocin       Induction Date/Time: 2021 5:00 PM    Indications for Induction: Elective    Augmentation:     Augmentation Date/Time:      Indications for Augmentation:     Labor complications: Additional complications:        Delivery Events:  Indications For Episiotomy:     Episiotomy: None   Perineal Laceration(s): None   Repaired:     Periurethral Laceration Location:      Repaired:     Labial Laceration Location:     Repaired:     Sulcal Laceration Location:     Repaired:     Vaginal Laceration Location:     Repaired:     Cervical Laceration Location:     Repaired:     Repair Suture: None   Number of Repair Packets:     Estimated Blood Loss (ml):  ml   Quantitative Blood Loss (ml)                Delivery Date: 2021    Delivery Time: 3:48 AM  Delivery Type: Vaginal, Spontaneous  Sex:  Male    Gestational Age: 36w2d   Delivery Clinician:  Julieta Desai  Living Status: Living   Delivery Location: L&D room 10          APGARS  One minute Five minutes Ten minutes   Skin color: 1   1        Heart rate: 2   2        Grimace: 2   2        Muscle tone: 2   2        Breathin   2        Totals: 8   9            Presentation: Vertex    Position:        Resuscitation Method:  Suctioning-bulb; Tactile Stimulation     Meconium Stained: None      Cord Information: 3 Vessels  Complications: Nuchal Cord With Compressions  Cord around: head  Delayed cord clamping? Yes  Cord clamped date/time:2021  3:50 AM  Disposition of Cord Blood: Discard    Blood Gases Sent?: No    Placenta:  Date/Time: 2021  3:52 AM  Removal: Spontaneous      Appearance: Normal     [place] Measurements:  Birth Weight:        Birth Length:        Head Circumference:        Chest Circumference:       Abdominal Girth: Other Providers:   ;YOLI SHIPMAN;GRAZYNA GARCÍA;;;;;;ARNULFO CRAIG, Obstetrician;Primary Nurse;Primary [place] Nurse;Nicu Nurse;Neonatologist;Anesthesiologist;Crna;Nurse Practitioner;Midwife;Nursery Nurse;Tech           Group B Strep:   Lab Results   Component Value Date/Time    GrBStrep, External POSITIVE 2019 12:00 AM     Information for the patient's :  Bogdan Bryant [[id number]]   No results found for: ABORH, PCTABR, PCTDIG, BILI, ABORHEXT, ABORH     No results for input(s): PCO2CB, PO2CB, HCO3I, SO2I, IBD, PTEMPI, SPECTI, PHICB, ISITE, IDEV, IALLEN in the last 72 hours.

## 2021-12-24 NOTE — PROGRESS NOTES
Bedside shift change report given to Daniel Crawley RN (oncoming nurse) by BARBIE Rust RN (offgoing nurse). Report included the following information SBAR.     0840  On assessment, patient stated that she recently got up on her own to use the restroom because she \"didn't want to bother anyone. \" Patient states she was aware that she should have called out for assistance. Encouraged patient to call out next time she has to get out of bed, patient indicates understanding. 1340  During hourly round, I offered to help patient to restroom to void. She states she already did a few minutes ago. Patient states she felt \"fine\" ambulating and denies any dizziness. Patient says she felt comfortable completing her own janette care. Encouraged patient to call for help out of bed if needed.

## 2021-12-25 VITALS
OXYGEN SATURATION: 98 % | RESPIRATION RATE: 16 BRPM | HEART RATE: 70 BPM | TEMPERATURE: 98.2 F | DIASTOLIC BLOOD PRESSURE: 80 MMHG | SYSTOLIC BLOOD PRESSURE: 120 MMHG | WEIGHT: 154 LBS | BODY MASS INDEX: 26.29 KG/M2 | HEIGHT: 64 IN

## 2021-12-25 PROCEDURE — 74011250637 HC RX REV CODE- 250/637: Performed by: MIDWIFE

## 2021-12-25 RX ADMIN — HYDROCODONE BITARTRATE AND ACETAMINOPHEN 1 TABLET: 5; 325 TABLET ORAL at 15:00

## 2021-12-25 RX ADMIN — IBUPROFEN 800 MG: 400 TABLET, FILM COATED ORAL at 09:12

## 2021-12-25 RX ADMIN — IBUPROFEN 800 MG: 400 TABLET, FILM COATED ORAL at 17:21

## 2021-12-25 RX ADMIN — HYDROCORTISONE ACETATE PRAMOXINE HCL: 2.5; 1 CREAM TOPICAL at 01:32

## 2021-12-25 RX ADMIN — HYDROCODONE BITARTRATE AND ACETAMINOPHEN 1 TABLET: 5; 325 TABLET ORAL at 01:32

## 2021-12-25 NOTE — ROUTINE PROCESS
Bedside shift change report given to SHANE Palomares RN (oncoming nurse) by AZIZA Barclay RN (offgoing nurse). Report included the following information SBAR, Intake/Output and MAR.

## 2021-12-25 NOTE — PROGRESS NOTES
Post-Partum Day Number 1 Progress Note      Patient doing well post-partum without significant complaint. She is voiding without difficulty, she reports normal lochia. She is ambulatiing without dizziness. Her pain is well controlled with oral pain medication. She is tolerating general diet. Vitals:  Patient Vitals for the past 8 hrs:   BP Temp Pulse Resp SpO2   21 0132 108/68 98.1 °F (36.7 °C) 77 16 97 %     Temp (24hrs), Av.1 °F (36.7 °C), Min:98 °F (36.7 °C), Max:98.1 °F (36.7 °C)        Exam:  Patient without distress. Lungs: CTA bilaterally                              Abdomen soft, nontender, nondistended, normal bowel sounds               Uterus: fundus firm at level of umbilicus, nontender               Lower extremities are negative for cords or tenderness, no swelling. Labs: No results found for this or any previous visit (from the past 24 hour(s)). Lab Results   Component Value Date/Time    Rubella, External IMMUNE 10/22/2018 12:00 AM    GrBStrep, External POSITIVE 2019 12:00 AM    HBsAg, External NEGATIVE 10/22/2018 12:00 AM    HIV, External NEGATIVE 10/22/2018 12:00 AM    RPR, External Non-reactive 10/22/2018 12:00 AM       Assessment and Plan:   Postpartum Day #1 S/P . Doing well.    - routine care   - anticipate discharge in AM

## 2021-12-25 NOTE — ROUTINE PROCESS
Bedside shift change report given to AZIZA Smalls (oncoming nurse) by Steward Lesch. Alida Dusky (offgoing nurse). Report included the following information SBAR.

## 2021-12-26 NOTE — ROUTINE PROCESS
I have reviewed discharge instructions with the patient. The patient verbalized understanding. Patient left via wheelchair.

## 2021-12-26 NOTE — PROGRESS NOTES
Infant cleared for early discharge. Call placed to [address] [place] [address] requesting same for pt. DC order received with plan to return to clinic for 6 wk postpartum appt. Dany RN notified of same.

## 2021-12-26 NOTE — ROUTINE PROCESS
Bedside shift change report given to AZIZA Luna (oncoming nurse) by Kerri Paredes (offgoing nurse). Report included the following information SBAR.

## 2021-12-26 NOTE — DISCHARGE SUMMARY
Obstetrical Discharge Summary     Name: Eleanor Neves MRN: [id number]  SSN: [id number]    YOB: 1995  Age: 23 y.o. Sex: female      Allergies: Patient has no known allergies. Admit Date: 2021    Discharge Date: 2021     Admitting Physician: He Lu CNM     Attending Physician:  No att. providers found     * Admission Diagnoses: Pregnancy [Z34.90]    * Discharge Diagnoses:   Information for the patient's :  Trenton Sa [[id number]]   Delivery of a 8 lb 2.7 oz (3.705 kg) male infant via Vaginal, Spontaneous on 2021 at 3:48 AM  by He Lu. Apgars were 8  and 9 . Additional Diagnoses:   Hospital Problems as of 2021 Date Reviewed: 2021          Codes Class Noted - Resolved POA    Pregnancy ICD-10-CM: Z34.90  ICD-9-CM: V22.2  2021 - Present Unknown             Lab Results   Component Value Date/Time    ABO/Rh(D) B POSITIVE 2021 01:51 PM    Rubella, External IMMUNE 10/22/2018 12:00 AM    GrBStrep, External neg 2021 12:00 AM    ABO,Rh B POS 10/22/2018 12:00 AM    There is no immunization history for the selected administration types on file for this patient. * Procedures:   * No surgery found *           * Discharge Condition: good    [facility] Course: Normal hospital course following the delivery.     * Disposition: Home    Discharge Medications:   Discharge Medication List as of 2021  9:13 PM      CONTINUE these medications which have NOT CHANGED    Details   famotidine (Pepcid) 20 mg tablet Take 20 mg by mouth two (2) times a day., Historical Med      PNV PG.39/QESACBR fum/folic ac (PRENATAL PO) Take  by mouth., Historical Med      ondansetron (ZOFRAN ODT) 4 mg disintegrating tablet Take 1 Tab by mouth every eight (8) hours as needed for Nausea or Vomiting., Normal, Disp-30 Tab, R-5      doxylamine-pyridoxine, vit B6, (Bonjesta) 20-20 mg TbID Take 1 Tab by mouth two (2) times a day., Normal, Disp-60 Tab, R-3 * Follow-up Care/Patient Instructions:   Activity: Activity as tolerated, No sex for 6 weeks and No heavy lifting for 6 weeks  Diet: Regular Diet  Wound Care: None needed    Follow-up Information     Follow up With Specialties Details Why Contact Info    Don Calvillo CNM Certified Nurse Midwife In 6 weeks Call to schedule 6 week postpartum appt [address] [place] [id number] [phone]

## 2021-12-26 NOTE — DISCHARGE INSTRUCTIONS
Patient Education        Vaginal Childbirth: Care Instructions  Overview     Vaginal birth means delivering a baby through the birth canal (vagina). During labor, the uterus tightens (contracts) regularly to thin and open the cervix and to push the baby out through the birth canal.  Your body will slowly heal in the next few weeks. It's easy to get too tired and overwhelmed during the first weeks after your baby is born. Changes in your hormones can shift your mood without warning. You may find it hard to meet the extra demands on your energy and time. Take it easy on yourself. Follow-up care is a key part of your treatment and safety. Be sure to make and go to all appointments, and call your doctor if you are having problems. It's also a good idea to know your test results and keep a list of the medicines you take. How can you care for yourself at home? Vaginal bleeding and cramps  · After delivery, you will have a bloody discharge from your vagina. This will turn pink within a week and then white or yellow after about 10 days. It may last for 2 to 4 weeks or longer, until the uterus has healed. Use sanitary pads until you stop bleeding. Using pads makes it easier to monitor your bleeding. · Don't worry if you pass some blood clots, as long as they are smaller than a golf ball. If you have a tear or stitches in your vaginal area, change the pad at least every 4 hours. This will help prevent soreness and infection. · You may have cramps for the first few days after childbirth. These are normal and occur as the uterus shrinks to normal size. Take an over-the-counter pain medicine, such as acetaminophen (Tylenol), ibuprofen (Advil, Motrin)n for cramps. Read and follow all instructions on the label. Do not take aspirin, because it can cause more bleeding. · Do not take two or more pain medicines at the same time unless the doctor told you to. Many pain medicines have acetaminophen, which is Tylenol.  Too much acetaminophen (Tylenol) can be harmful. · A heating pad will be helpful for uterine cramps. Stitches  · If you have stitches, they will dissolve on their own and don't need to be removed. Follow your doctor's instructions for cleaning the stitched area. · Put ice or a cold pack on your painful area for 10 to 20 minutes at a time, several times a day, for the first few days. Put a thin cloth between the ice and your skin. · Sit in a few inches of warm water (sitz bath) 3 times a day and after bowel movements. The warm water helps with pain and itching. If you don't have a tub, a warm shower might help. Breast fullness  · Your breasts may overfill (engorge) in the first few days after delivery. To help milk flow and to relieve pain, warm your breasts in the shower or by using warm, moist towels before nursing. · If you aren't nursing, don't put warmth on your breasts or touch your breasts. Wear a bra that fits well and use ice until the fullness goes away. This usually takes 2 to 3 days. · Put ice or a cold pack on your breast after nursing to reduce swelling and pain. Put a thin cloth between the ice and your skin. Activity  · Eat a balanced diet. Don't try to lose weight by cutting calories. Keep taking your prenatal vitamins, or take a multivitamin. · Get as much rest as you can. Try to take naps when your baby sleeps during the day. · Get some exercise every day. But don't do any heavy exercise until your doctor says it is okay. · Wait until you are healed (about 4 to 6 weeks) before you have sexual intercourse. Your doctor will tell you when it is okay to have sex. · If you don't want to get pregnant, talk to your doctor about birth control. You can get pregnant even before your period returns. Also, you can get pregnant while you are breastfeeding. Mental health  · It's normal to have some sadness, anxiety, sleeplessness, and mood swings after you go home.  If you feel upset or hopeless for more than a few days or are having trouble doing the things you need to do, talk to your doctor. Constipation and hemorrhoids  · Drink plenty of fluids. If you have kidney, heart, or liver disease and have to limit fluids, talk with your doctor before you increase the amount of fluids you drink. · Eat plenty of fiber each day. Have a bran muffin or bran cereal for breakfast. Try eating a piece of fruit for a mid-afternoon snack. · For painful, itchy hemorrhoids, put ice or a cold pack on the area several times a day for 10 minutes at a time. Follow this by putting a warm compress on the area for another 10 to 20 minutes or by sitting in a shallow, warm bath. When should you call for help? Call 911  anytime you think you may need emergency care. For example, call if:    · You have thoughts of harming yourself, your baby, or another person.     · You passed out (lost consciousness).     · You have chest pain, are short of breath, or cough up blood.     · You have a seizure. Call your doctor now or seek immediate medical care if:    · You have severe vaginal bleeding.     · You are dizzy or lightheaded, or you feel like you may faint.     · You have a fever.     · You have new or more pain in your belly or pelvis.     · You have symptoms of a blood clot in your leg (called a deep vein thrombosis), such as:  ? Pain in the calf, back of the knee, thigh, or groin. ? Redness and swelling in your leg or groin.     · You have signs of preeclampsia, such as:  ? Sudden swelling of your face, hands, or feet. ? New vision problems (such as dimness, blurring, or seeing spots). ? A severe headache. Watch closely for changes in your health, and be sure to contact your doctor if:    · Your vaginal bleeding seems to be getting heavier.     · You have new or worse vaginal discharge.     · You feel sad, anxious, or hopeless for more than a few days.     · You do not get better as expected. Where can you learn more?   Go to http://www.gray.com/  Enter F704 in the search box to learn more about \"Vaginal Childbirth: Care Instructions. \"  Current as of: June 16, 2021               Content Version: 13.0  © 7503-8449 [employer]. Care instructions adapted under license by [employer] (which disclaims liability or warranty for this information). If you have questions about a medical condition or this instruction, always ask your healthcare professional. [place] [address] any warranty or liability for your use of this information. Patient Education        Depression After Childbirth: Care Instructions  Overview     Many women get the \"baby blues\" during the first few days after childbirth. You may lose sleep, feel irritable, and cry easily. You may feel happy one minute and sad the next. Hormone changes are one cause of these emotional changes. Also, the demands of a new baby, along with visits from relatives or other family needs, can add to the stress. The \"baby blues\" often peak around the fourth day. Then they ease up in less than 2 weeks. If your moodiness or anxiety lasts for more than 2 weeks, or if you feel like life is not worth living, you may have postpartum depression. This is different for each person. Some mothers with serious depression may worry intensely about their infant's well-being. Others may feel distant from their child. Some mothers may even feel that they might harm their baby. Some may have signs of paranoia, wondering if someone is watching them. Depression is not a sign of weakness. It's a medical condition that requires treatment. Medicine and counseling often work well to reduce depression. Talk to your doctor about taking antidepressant medicine while breastfeeding. Follow-up care is a key part of your treatment and safety. Be sure to make and go to all appointments, and call your doctor if you are having problems.  It's also a good idea to know your test results and keep a list of the medicines you take. How do you know if you are depressed? With all the changes in your life, you may not know if you are depressed. Pregnancy sometimes causes changes in how you feel that are similar to the symptoms of depression. Symptoms of depression include:  · Feeling sad or hopeless and losing interest in daily activities. These are the most common symptoms of depression. · Sleeping too much or not enough. · Feeling tired. You may feel as if you have no energy. · Eating too much or too little. · Writing or talking about death, such as writing suicide notes or talking about guns, knives, or pills. Keep the numbers for these national suicide hotlines: 6-818-142-TALK (4-693.415.2337) and 9-072-BCAFONA (3-288.262.9681). If you or someone you know talks about suicide or feeling hopeless, get help right away. How can you care for yourself at home? · Be safe with medicines. Take your medicines exactly as prescribed. Call your doctor if you think you are having a problem with your medicine. · Eat a healthy diet so that you can keep up your energy. · Get regular daily exercise, such as walks, to help improve your mood. · Get as much sunlight as possible. Keep your shades and curtains open. Get outside as much as you can. · Avoid using alcohol or other substances to feel better. · Get as much rest and sleep as possible. Avoid doing too much. Being too tired can increase depression. · Play stimulating music throughout your day and soothing music at night. · Schedule outings and visits with friends and family. Ask them to call you regularly, so that you don't feel alone. · Ask for help with preparing food and other daily tasks. Family and friends are often happy to help with a . · Be honest with yourself and those who care about you. Tell them about your struggle. · Join a support group of new mothers.  No one can better understand the challenges of caring for a  than other new mothers. · If you feel like life is not worth living or you're feeling hopeless, get help right away. Keep the numbers for these national suicide hotlines: 3-786-448-TALK (7-129.494.3902) and 0-118-IZGDYDK (4-870.864.1363). When should you call for help? Call 911 anytime you think you may need emergency care. For example, call if:    · You feel you cannot stop from hurting yourself, your baby, or someone else. Call your doctor now or seek immediate medical care if:    · You are having trouble caring for yourself or your baby.     · You hear voices. Watch closely for changes in your health, and be sure to contact your doctor if:    · You have problems with your depression medicine.     · You do not get better as expected. Where can you learn more? Go to http://www.gray.com/  Enter [id number] in the search box to learn more about \"Depression After Childbirth: Care Instructions. \"  Current as of: 2021               Content Version: 13.0  © 4362-6301 [employer]. Care instructions adapted under license by [employer] (which disclaims liability or warranty for this information). If you have questions about a medical condition or this instruction, always ask your healthcare professional. [address] any warranty or liability for your use of this information.

## 2022-02-04 ENCOUNTER — OFFICE VISIT (OUTPATIENT)
Dept: OBGYN CLINIC | Age: 27
End: 2022-02-04
Payer: COMMERCIAL

## 2022-02-04 VITALS
SYSTOLIC BLOOD PRESSURE: 108 MMHG | HEIGHT: 64 IN | DIASTOLIC BLOOD PRESSURE: 64 MMHG | WEIGHT: 133 LBS | BODY MASS INDEX: 22.71 KG/M2

## 2022-02-04 DIAGNOSIS — Z30.017 NEXPLANON INSERTION: Primary | ICD-10-CM

## 2022-02-04 PROCEDURE — 11981 INSERTION DRUG DLVR IMPLANT: CPT | Performed by: OBSTETRICS & GYNECOLOGY

## 2022-02-04 NOTE — PROGRESS NOTES
Procedure note: Nexplanon insertion    Umesh Mack is a ,  23 y.o. female OTHER whose No LMP recorded. was on  presents for office insertion of an [address] sub-dermal contraceptive implant. She has had an opportunity to read the [address] \"Patient Labeling and Consent Form\". We counseled Tonet about the insertion and removal procedures as well as potential side-effects, benefits and risks. She state she had no further questions and signed the consent form. She has been using abstinence to the present time. She confirmed that she has no allergies to Betadine or Xylocaine. She reclined on the examination table in the supine position with her left arm flexed at the elbow and externally rotated. The insertion site was identified and marked approximately 6-8 cm proximal to the elbow crease at the inner side of the upper arm overlying the triceps muscles. A second fidencio was placed 6-8 cm above the first fidencio. The insertion site was cleansed with Betadine antiseptic. Approximately 5 ml of 2% xylocaine with epinephrine were injected just under the skin along the planned insertion canal. The NEXPLANON sterile applicator was carefully removed from its blister pack and kept sterile. I removed the needle cap. I visually verified the presence of NEXPLANON inside the needle tip. The skin at the insertion site was then stretched by my thumb and index finger. I then inserted the needle tip through the skin at the appropriate angle to the skin surface, just until the skin has been penetrated. The needle was gently inserted to its full length. The slider was then pushed all the way and then released. I then removed the needle and palpated the implant in the appropriate location. The patient also palpated the implant in place. Both Eduardo and I were able to confirm the presence of the [address] in its subdermal location by palpation.    I placed a small adhesive bandage over the insertion site then wrapped a pressure bandage with sterile gauze. The patient User Card and Patient Chart Label were filled in. She was given the User Card for her records after explaining it to her in detail. I stressed to her that she must have the Raymon Summers removed before three years from today's date. She was then given the Personal Calendar (Bleeding Diary) with instructions in it's use. The patient received Nexplanon lot number [id number].       Lanny Sharpe MD  2/4/2022  12:22 PM

## 2022-02-04 NOTE — PATIENT INSTRUCTIONS
Learning About Birth Control: The Implant  What is the implant? The implant is used to prevent pregnancy. It's a thin raymond about the size of a matchstick that is inserted under the skin (subdermal) on the inside of your arm. The implant releases the hormone progestin to prevent pregnancy. Progestin prevents pregnancy in these ways: It thickens the mucus in the cervix. This makes it hard for sperm to travel into the uterus. It also thins the lining of the uterus, which makes it harder for a fertilized egg to attach to the uterus. Progestin can sometimes stop the ovaries from releasing an egg each month (ovulation). The implant prevents pregnancy for 3 years. But your doctor may talk to you about leaving it in for longer. Once it is put in, you don't have to do anything else to prevent pregnancy. The implant can only be inserted by your doctor or another trained health professional. It only takes a few minutes. This can also be done right after you give birth. Your doctor will remove the implant when it needs to be taken out. Your doctor numbs the area and \"injects\" the implant under your skin. No cuts are made in your skin. To remove the implant, your doctor numbs the area, makes a small cut in the skin, and pulls the implant out. How well does it work? The implant works very well. Fewer than 1 woman out of 100 has an unplanned pregnancy. Be sure to tell your doctor about any health problems you have or medicines you take. He or she can help you choose the birth control method that is right for you. What are the advantages of the implant? · The implant is one of the most effective methods of birth control. · It prevents pregnancy for up to 3 years. You don't have to worry about birth control for this time. · It's safe to use while breastfeeding. · The implant doesn't contain estrogen.  So you can use it if you don't want to take estrogen or can't take estrogen because you have certain health problems or concerns. · It may reduce heavy bleeding and cramping. · It's convenient. It is always providing birth control and cannot be seen. You don't need to remember to take a pill or get a shot. You don't have to interrupt sex to protect against pregnancy. What are the disadvantages of the implant? · The implant doesn't protect against sexually transmitted infections (STIs), such as herpes or HIV/AIDS. If you aren't sure if your sex partner might have an STI, use a condom to protect against infection. · It may cause irregular periods, or you may have spotting between periods. You may also stop getting a period. Some women see having no period as an advantage. · It may cause mood changes, less interest in sex, or weight gain. · You have to see a doctor to have an implant inserted and removed. Where can you learn more? Go to http://www.gray.com/  Enter F276 in the search box to learn more about \"Learning About Birth Control: The Implant. \"  Current as of: June 16, 2021               Content Version: 13.0  © 2006-2021 [employer]. Care instructions adapted under license by [employer] (which disclaims liability or warranty for this information). If you have questions about a medical condition or this instruction, always ask your healthcare professional. [address] any warranty or liability for your use of this information.

## 2022-02-04 NOTE — PROGRESS NOTES
Postpartum evaluation    Khloe Westfall is a 23 y.o. female who presents for a postpartum exam.     She is now six weeks post normal spontaneous vaginal delivery. Her baby is doing well. She has had no menses since delivery. She has had the following significant problems since her delivery: none    The patient is breast feeding without difficulty. The patient would like to use nexplanon for birth control. She is currently taking: no medications. She is due for her next AE in 12 months. Visit Vitals  /64   Ht 5' 4\" (1.626 m)   Wt 133 lb (60.3 kg)   Breastfeeding Yes   BMI 22.83 kg/m²       PHYSICAL EXAMINATION    Constitutional  · Appearance: well-nourished, well developed, alert, in no acute distress    HENT  · Head and Face: appears normal    Neck  · Inspection/Palpation: normal appearance, no masses or tenderness  · Lymph Nodes: no lymphadenopathy present  · Thyroid: gland size normal, nontender, no nodules or masses present on palpation    Breasts  · Inspection of Breasts: breasts symmetrical, no skin changes, no discharge present, nipple appearance normal, no skin retraction present  · Palpation of Breasts and Axillae: no masses present on palpation, no breast tenderness  · Axillary Lymph Nodes: no lymphadenopathy present    Gastrointestinal  · Abdominal Examination: abdomen non-tender to palpation, normal bowel sounds, no masses present  · Liver and spleen: no hepatomegaly present, spleen not palpable  · Hernias: no hernias identified      Neurologic/Psychiatric  · Mental Status:  · Orientation: grossly oriented to person, place and time  · Mood and Affect: mood normal, affect appropriate    Assessment:  Normal postpartum check  Last pap 04/21  K[place]lin depression scale 5    Plan:  RTO for AE.   Rx for contraception: Nexplanon placed by Dr. Vishnu Mascorro today

## 2022-03-18 PROBLEM — Z37.9 NORMAL LABOR: Status: ACTIVE | Noted: 2019-04-06

## 2022-03-20 PROBLEM — Z3A.08 8 WEEKS GESTATION OF PREGNANCY: Status: ACTIVE | Noted: 2021-05-14

## 2022-03-20 PROBLEM — Z34.90 PREGNANCY: Status: ACTIVE | Noted: 2021-12-23

## 2022-11-09 ENCOUNTER — OFFICE VISIT (OUTPATIENT)
Dept: INTERNAL MEDICINE CLINIC | Age: 27
End: 2022-11-09
Payer: COMMERCIAL

## 2022-11-09 ENCOUNTER — HOSPITAL ENCOUNTER (OUTPATIENT)
Dept: GENERAL RADIOLOGY | Age: 27
Discharge: HOME | End: 2022-11-09
Payer: COMMERCIAL

## 2022-11-09 VITALS
SYSTOLIC BLOOD PRESSURE: 100 MMHG | WEIGHT: 130.6 LBS | DIASTOLIC BLOOD PRESSURE: 76 MMHG | HEIGHT: 64 IN | RESPIRATION RATE: 12 BRPM | OXYGEN SATURATION: 97 % | BODY MASS INDEX: 22.3 KG/M2 | HEART RATE: 78 BPM | TEMPERATURE: 98 F

## 2022-11-09 DIAGNOSIS — M54.50 ACUTE MIDLINE LOW BACK PAIN WITHOUT SCIATICA: ICD-10-CM

## 2022-11-09 DIAGNOSIS — Z00.00 ENCOUNTER FOR MEDICAL EXAMINATION TO ESTABLISH CARE: Primary | ICD-10-CM

## 2022-11-09 DIAGNOSIS — R45.7 EMOTIONAL STRESS: ICD-10-CM

## 2022-11-09 PROBLEM — Z86.19 HISTORY OF HELICOBACTER PYLORI INFECTION: Status: ACTIVE | Noted: 2022-11-09

## 2022-11-09 PROBLEM — R12 HEARTBURN: Status: ACTIVE | Noted: 2022-11-09

## 2022-11-09 PROCEDURE — 99204 OFFICE O/P NEW MOD 45 MIN: CPT | Performed by: INTERNAL MEDICINE

## 2022-11-09 PROCEDURE — 72100 X-RAY EXAM L-S SPINE 2/3 VWS: CPT

## 2022-11-09 RX ORDER — ESCITALOPRAM OXALATE 5 MG/1
5 TABLET ORAL DAILY
Qty: 30 TABLET | Refills: 1 | Status: SHIPPED | OUTPATIENT
Start: 2022-11-09

## 2022-11-09 RX ORDER — ETONOGESTREL 68 MG/1
1 IMPLANT SUBCUTANEOUS
COMMUNITY

## 2022-11-09 RX ORDER — PANTOPRAZOLE SODIUM 20 MG/1
20 TABLET, DELAYED RELEASE ORAL DAILY
COMMUNITY

## 2022-11-09 NOTE — PROGRESS NOTES
ADVISED PATIENT OF THE FOLLOWING HEALTH MAINTAINCE DUE  Health Maintenance Due   Topic Date Due    Hepatitis C Screening  Never done    COVID-19 Vaccine (1) Never done    HPV Age 9Y-34Y (1 - 2-dose series) Never done    Depression Monitoring  Never done    DTaP/Tdap/Td series (1 - Tdap) Never done    Pap Smear  Never done    Flu Vaccine (1) Never done      Chief Complaint   Patient presents with    Establish Care    Back Pain     Pt fell in the bathroom a while ago and could barely stand after. She is still suffering from major back pain. Depression     Would like a referral to counseling or mental health help. Physical     Would like an eye exam        1. \"Have you been to the ER, urgent care clinic since your last visit? Hospitalized since your last visit? \" No    2. \"Have you seen or consulted any other health care providers outside of the [address] [place] [address] since your last visit? \" No     3. For patients aged 39-70: Has the patient had a colonoscopy / FIT/ Cologuard? Pylori bacteria in Pt stomach. Pt had an endoscopy in 2020      If the patient is female:    4. For patients aged 41-77: Has the patient had a mammogram within the past 2 years? NA - based on age or sex      11. For patients aged 21-65: Has the patient had a pap smear?  no

## 2022-11-10 LAB
ALBUMIN SERPL-MCNC: 5 G/DL (ref 3.9–5)
ALBUMIN/GLOB SERPL: 1.4 {RATIO} (ref 1.2–2.2)
ALP SERPL-CCNC: 86 IU/L (ref 44–121)
ALT SERPL-CCNC: 14 IU/L (ref 0–32)
AST SERPL-CCNC: 18 IU/L (ref 0–40)
BASOPHILS # BLD AUTO: 0 X10E3/UL (ref 0–0.2)
BASOPHILS NFR BLD AUTO: 1 %
BILIRUB SERPL-MCNC: <0.2 MG/DL (ref 0–1.2)
BUN SERPL-MCNC: 13 MG/DL (ref 6–20)
BUN/CREAT SERPL: 25 (ref 9–23)
CALCIUM SERPL-MCNC: 9.9 MG/DL (ref 8.7–10.2)
CHLORIDE SERPL-SCNC: 103 MMOL/L (ref 96–106)
CO2 SERPL-SCNC: 20 MMOL/L (ref 20–29)
CREAT SERPL-MCNC: 0.52 MG/DL (ref 0.57–1)
EGFR: 131 ML/MIN/1.73
EOSINOPHIL # BLD AUTO: 0.1 X10E3/UL (ref 0–0.4)
EOSINOPHIL NFR BLD AUTO: 2 %
ERYTHROCYTE [DISTWIDTH] IN BLOOD BY AUTOMATED COUNT: 13.1 % (ref 11.7–15.4)
GLOBULIN SER CALC-MCNC: 3.6 G/DL (ref 1.5–4.5)
GLUCOSE SERPL-MCNC: 88 MG/DL (ref 70–99)
HCT VFR BLD AUTO: 38.9 % (ref 34–46.6)
HGB BLD-MCNC: 12.9 G/DL (ref 11.1–15.9)
IMM GRANULOCYTES # BLD AUTO: 0 X10E3/UL (ref 0–0.1)
IMM GRANULOCYTES NFR BLD AUTO: 0 %
LYMPHOCYTES # BLD AUTO: 1.9 X10E3/UL (ref 0.7–3.1)
LYMPHOCYTES NFR BLD AUTO: 31 %
MCH RBC QN AUTO: 27.7 PG (ref 26.6–33)
MCHC RBC AUTO-ENTMCNC: 33.2 G/DL (ref 31.5–35.7)
MCV RBC AUTO: 84 FL (ref 79–97)
MONOCYTES # BLD AUTO: 0.5 X10E3/UL (ref 0.1–0.9)
MONOCYTES NFR BLD AUTO: 7 %
NEUTROPHILS # BLD AUTO: 3.6 X10E3/UL (ref 1.4–7)
NEUTROPHILS NFR BLD AUTO: 59 %
PLATELET # BLD AUTO: 264 X10E3/UL (ref 150–450)
POTASSIUM SERPL-SCNC: 4.2 MMOL/L (ref 3.5–5.2)
PROT SERPL-MCNC: 8.6 G/DL (ref 6–8.5)
RBC # BLD AUTO: 4.66 X10E6/UL (ref 3.77–5.28)
SODIUM SERPL-SCNC: 141 MMOL/L (ref 134–144)
TSH SERPL DL<=0.005 MIU/L-ACNC: 1.39 UIU/ML (ref 0.45–4.5)
WBC # BLD AUTO: 6.1 X10E3/UL (ref 3.4–10.8)

## 2022-11-10 NOTE — PROGRESS NOTES
HISTORY OF PRESENT ILLNESS  Valentine Borja is a 33 y.o. female. Patient was seen to establish care. Denies any PMH. Does reports that she has been increasing in stress and emotional after the recent dx of her son with autism. This has been an adjustment. Has a younger son as well. Has the support from her family but her  has been suffering from anxiety as well. Wants to consider counseling and medication. Reports that she fell a few months back onto the tub. Slipped per patient. Has not been able to stand straight since. Hurts to lay on her stomach. Describes the pain as sharp and constant. Has taken OTC and applied ICE at the time of the falls. PAP was done before giving birth last year. Will need some lab work. Also reports some changes in her vision. Not burred, but \"altered\". Does wear prescription glasses and contacts. Visit Vitals  /76 (BP 1 Location: Left upper arm, BP Patient Position: Sitting, BP Cuff Size: Adult)   Pulse 78   Temp 98 °F (36.7 °C) (Oral)   Resp 12   Ht 5' 4\" (1.626 m)   Wt 130 lb 9.6 oz (59.2 kg)   LMP  (LMP Unknown)   SpO2 97%   BMI 22.42 kg/m²     History reviewed. No pertinent past medical history. History reviewed. No pertinent surgical history. Family History   Problem Relation Age of Onset    GERD Mother     Hypertension Mother     GERD Father      Outpatient Encounter Medications as of 11/9/2022   Medication Sig Dispense Refill    pantoprazole (PROTONIX) 20 mg tablet Take 20 mg by mouth daily. etonogestreL (Nexplanon) 68 mg impl 1 Each by SubDERmal route. escitalopram oxalate (LEXAPRO) 5 mg tablet Take 1 Tablet by mouth daily. 30 Tablet 1    [DISCONTINUED] PNV [id number].[id number] fum/folic ac (PRENATAL PO) Take  by mouth. (Patient not taking: Reported on 11/9/2022)       No facility-administered encounter medications on file as of 11/9/2022. HPI    Review of Systems   Constitutional: Negative. Respiratory: Negative.      Cardiovascular:  Negative for chest pain and palpitations. Gastrointestinal: Negative. Genitourinary: Negative. Musculoskeletal:  Positive for back pain. Neurological: Negative. Psychiatric/Behavioral:  Positive for depression. Negative for suicidal ideas. The patient is nervous/anxious. Physical Exam  Vitals and nursing note reviewed. Cardiovascular:      Rate and Rhythm: Normal rate and regular rhythm. Pulmonary:      Effort: Pulmonary effort is normal.      Breath sounds: Normal breath sounds. Abdominal:      General: Bowel sounds are normal.      Palpations: Abdomen is soft. Musculoskeletal:      Cervical back: Neck supple. Lumbar back: Tenderness present. No swelling. Normal range of motion. Back:    Skin:     General: Skin is warm. Neurological:      Mental Status: She is alert and oriented to person, place, and time. Psychiatric:         Mood and Affect: Mood is anxious and depressed. Affect is tearful. ASSESSMENT and PLAN  Diagnoses and all orders for this visit:    1. Encounter for medical examination to establish care  -     TSH 3RD GENERATION; Future  -     METABOLIC PANEL, COMPREHENSIVE; Future  -     CBC WITH AUTOMATED DIFF; Future    2. Emotional stress  -     TSH 3RD GENERATION; Future  -     METABOLIC PANEL, COMPREHENSIVE; Future  -     CBC WITH AUTOMATED DIFF; Future  -     REFERRAL TO PSYCHIATRY  -     escitalopram oxalate (LEXAPRO) 5 mg tablet; Take 1 Tablet by mouth daily. 3. Acute midline low back pain without sciatica  -     XR SPINE LUMB 2 OR 3 V; Future    Patient to follow up with optometry such as [facility]  Spen at total of 45 min with this patient face to face.    Follow-up and Dispositions    Return in about 4 weeks (around 12/7/2022), or if symptoms worsen or fail to improve.       lab results and schedule of future lab studies reviewed with patient  reviewed diet, exercise and weight control  reviewed medications and side effects in detail

## 2022-12-06 ENCOUNTER — OFFICE VISIT (OUTPATIENT)
Dept: INTERNAL MEDICINE CLINIC | Age: 27
End: 2022-12-06
Payer: COMMERCIAL

## 2022-12-06 ENCOUNTER — TELEPHONE (OUTPATIENT)
Dept: INTERNAL MEDICINE CLINIC | Age: 27
End: 2022-12-06

## 2022-12-06 VITALS
TEMPERATURE: 97.5 F | HEART RATE: 84 BPM | BODY MASS INDEX: 21.51 KG/M2 | OXYGEN SATURATION: 97 % | HEIGHT: 64 IN | DIASTOLIC BLOOD PRESSURE: 76 MMHG | SYSTOLIC BLOOD PRESSURE: 106 MMHG | RESPIRATION RATE: 12 BRPM | WEIGHT: 126 LBS

## 2022-12-06 DIAGNOSIS — R12 HEARTBURN: Primary | ICD-10-CM

## 2022-12-06 DIAGNOSIS — R68.82 LOW LIBIDO: ICD-10-CM

## 2022-12-06 DIAGNOSIS — R10.9 ABDOMINAL PRESSURE: ICD-10-CM

## 2022-12-06 DIAGNOSIS — N89.8 VAGINAL DISCHARGE: ICD-10-CM

## 2022-12-06 DIAGNOSIS — R45.7 EMOTIONAL STRESS: Primary | ICD-10-CM

## 2022-12-06 LAB
BILIRUB UR QL STRIP: NEGATIVE
GLUCOSE UR-MCNC: NEGATIVE MG/DL
KETONES P FAST UR STRIP-MCNC: NEGATIVE MG/DL
PH UR STRIP: 5.5 [PH] (ref 4.6–8)
PROT UR QL STRIP: NEGATIVE
SP GR UR STRIP: 1.03 (ref 1–1.03)
UA UROBILINOGEN AMB POC: NORMAL (ref 0.2–1)
URINALYSIS CLARITY POC: CLEAR
URINALYSIS COLOR POC: NORMAL
URINE BLOOD POC: NEGATIVE
URINE LEUKOCYTES POC: NEGATIVE
URINE NITRITES POC: NEGATIVE

## 2022-12-06 PROCEDURE — 81002 URINALYSIS NONAUTO W/O SCOPE: CPT | Performed by: INTERNAL MEDICINE

## 2022-12-06 PROCEDURE — 99213 OFFICE O/P EST LOW 20 MIN: CPT | Performed by: INTERNAL MEDICINE

## 2022-12-06 RX ORDER — BUPROPION HYDROCHLORIDE 150 MG/1
150 TABLET ORAL
Qty: 30 TABLET | Refills: 1 | Status: SHIPPED | OUTPATIENT
Start: 2022-12-06

## 2022-12-06 RX ORDER — PANTOPRAZOLE SODIUM 20 MG/1
20 TABLET, DELAYED RELEASE ORAL DAILY
Qty: 90 TABLET | Refills: 1 | Status: SHIPPED | OUTPATIENT
Start: 2022-12-06

## 2022-12-06 NOTE — PROGRESS NOTES
HISTORY OF PRESENT ILLNESS  Tasia Gómez is a 33 y.o. female. Patient was seen for a follow up. Was started on Lexapro 5 mg for her anxiety and emotions stress. Felt like it helped and now feels like it is not. Also does admit that she has been low libido awhile. Also describes vaginal dryness with sex, increase in vaginal discharge and abdominal pressure. No fever, flank pain or dysuria. Visit Vitals  /76 (BP 1 Location: Left upper arm, BP Patient Position: Sitting, BP Cuff Size: Adult)   Pulse 84   Temp 97.5 °F (36.4 °C) (Oral)   Resp 12   Ht 5' 4\" (1.626 m)   Wt 126 lb (57.2 kg)   LMP  (LMP Unknown)   SpO2 97%   BMI 21.63 kg/m²     History reviewed. No pertinent past medical history. History reviewed. No pertinent surgical history. Family History   Problem Relation Age of Onset    GERD Mother     Hypertension Mother     GERD Father      Outpatient Encounter Medications as of 12/6/2022   Medication Sig Dispense Refill    buPROPion XL (WELLBUTRIN XL) 150 mg tablet Take 1 Tablet by mouth every morning. 30 Tablet 1    pantoprazole (PROTONIX) 20 mg tablet Take 20 mg by mouth daily. etonogestreL (Nexplanon) 68 mg impl 1 Each by SubDERmal route. [DISCONTINUED] escitalopram oxalate (LEXAPRO) 5 mg tablet Take 1 Tablet by mouth daily. 30 Tablet 1     No facility-administered encounter medications on file as of 12/6/2022. HPI    Review of Systems   Constitutional: Negative. Respiratory: Negative. Cardiovascular:  Negative for chest pain and palpitations. Gastrointestinal: Negative. Genitourinary:  Negative for flank pain, frequency and urgency. Vaginal dryness, increase in discharge    Neurological: Negative. Psychiatric/Behavioral:  Positive for depression. The patient is nervous/anxious. Physical Exam  Vitals and nursing note reviewed. Constitutional:       Comments: Appears tired    Cardiovascular:      Rate and Rhythm: Normal rate and regular rhythm.    Pulmonary: Effort: Pulmonary effort is normal.      Breath sounds: Normal breath sounds. Abdominal:      General: Bowel sounds are normal. There is no distension. Palpations: Abdomen is soft. Tenderness: There is no abdominal tenderness. There is no right CVA tenderness or left CVA tenderness. Skin:     General: Skin is warm. Neurological:      Mental Status: She is alert and oriented to person, place, and time. Psychiatric:         Mood and Affect: Mood is depressed. ASSESSMENT and PLAN  Diagnoses and all orders for this visit:    1. Emotional stress  -     buPROPion XL (WELLBUTRIN XL) 150 mg tablet; Take 1 Tablet by mouth every morning. 2. Abdominal pressure  -     AMB POC URINALYSIS DIP STICK MANUAL W/O MICRO  -     CULTURE, URINE; Future  -     NUSWAB VAGINITIS PLUS; Future    3. Vaginal discharge        -    self swab with Nuswab   4.  Low libido        -    going to switch to an alternative psych medication due to SE    Follow-up and Dispositions    Return in about 6 weeks (around 1/17/2023), or if symptoms worsen or fail to improve.       lab results and schedule of future lab studies reviewed with patient  reviewed diet, exercise and weight control  reviewed medications and side effects in detail

## 2022-12-06 NOTE — PROGRESS NOTES
ADVISED PATIENT OF THE FOLLOWING HEALTH MAINTAINCE DUE  Health Maintenance Due   Topic Date Due    Hepatitis C Screening  Never done    COVID-19 Vaccine (1) Never done    HPV Age 9Y-34Y (1 - 2-dose series) Never done    DTaP/Tdap/Td series (1 - Tdap) Never done    Pap Smear  Never done    Flu Vaccine (1) Never done      Chief Complaint   Patient presents with    Follow Up Chronic Condition    Heartburn     Pt has tried many OTC meds and she needs something stronger. 1. \"Have you been to the ER, urgent care clinic since your last visit? Hospitalized since your last visit? \" No    2. \"Have you seen or consulted any other health care providers outside of the [address] [place] [address] since your last visit? \" No     3. For patients aged 39-70: Has the patient had a colonoscopy / FIT/ Cologuard? NA - based on age      If the patient is female:    4. For patients aged 41-77: Has the patient had a mammogram within the past 2 years? NA - based on age or sex      11. For patients aged 21-65: Has the patient had a pap smear?  No

## 2022-12-06 NOTE — PROGRESS NOTES
Results for orders placed or performed in visit on 12/06/22   AMB POC URINALYSIS DIP STICK MANUAL W/O MICRO   Result Value Ref Range    Color (UA POC) Light Yellow     Clarity (UA POC) Clear     Glucose (UA POC) Negative Negative    Bilirubin (UA POC) Negative Negative    Ketones (UA POC) Negative Negative    Specific gravity (UA POC) 1.030 1.001 - 1.035    Blood (UA POC) Negative Negative    pH (UA POC) 5.5 4.6 - 8.0    Protein (UA POC) Negative Negative    Urobilinogen (UA POC) 0.2 mg/dL 0.2 - 1    Nitrites (UA POC) Negative Negative    Leukocyte esterase (UA POC) Negative Negative

## 2022-12-06 NOTE — TELEPHONE ENCOUNTER
Patient stated that she forgot to mention having heartburn during her recent appointment today. Patient is requesting a medication for heartburn.      Last OV: 12/06/2022  Next OV: 01/16/2023    Please return call at [phone]

## 2022-12-08 LAB
A VAGINAE DNA VAG QL NAA+PROBE: NORMAL SCORE
BVAB2 DNA VAG QL NAA+PROBE: NORMAL SCORE
C ALBICANS DNA VAG QL NAA+PROBE: NEGATIVE
C GLABRATA DNA VAG QL NAA+PROBE: NEGATIVE
C TRACH DNA VAG QL NAA+PROBE: NEGATIVE
MEGA1 DNA VAG QL NAA+PROBE: NORMAL SCORE
N GONORRHOEA DNA VAG QL NAA+PROBE: NEGATIVE
T VAGINALIS DNA VAG QL NAA+PROBE: NEGATIVE

## 2022-12-14 DIAGNOSIS — N39.0 URINARY TRACT INFECTION WITHOUT HEMATURIA, SITE UNSPECIFIED: Primary | ICD-10-CM

## 2022-12-14 RX ORDER — AMOXICILLIN AND CLAVULANATE POTASSIUM 875; 125 MG/1; MG/1
1 TABLET, FILM COATED ORAL 2 TIMES DAILY
Qty: 14 TABLET | Refills: 0 | Status: SHIPPED | OUTPATIENT
Start: 2022-12-14

## 2023-01-17 ENCOUNTER — OFFICE VISIT (OUTPATIENT)
Dept: INTERNAL MEDICINE CLINIC | Age: 28
End: 2023-01-17
Payer: COMMERCIAL

## 2023-01-17 VITALS
TEMPERATURE: 97.8 F | WEIGHT: 126 LBS | OXYGEN SATURATION: 99 % | BODY MASS INDEX: 21.51 KG/M2 | HEIGHT: 64 IN | DIASTOLIC BLOOD PRESSURE: 68 MMHG | HEART RATE: 84 BPM | SYSTOLIC BLOOD PRESSURE: 110 MMHG

## 2023-01-17 DIAGNOSIS — N39.0 URINARY TRACT INFECTION WITHOUT HEMATURIA, SITE UNSPECIFIED: Primary | ICD-10-CM

## 2023-01-17 DIAGNOSIS — R51.9 SINUS HEADACHE: ICD-10-CM

## 2023-01-17 DIAGNOSIS — F41.9 ANXIETY: ICD-10-CM

## 2023-01-17 DIAGNOSIS — R45.7 EMOTIONAL STRESS: ICD-10-CM

## 2023-01-17 PROCEDURE — 99213 OFFICE O/P EST LOW 20 MIN: CPT | Performed by: INTERNAL MEDICINE

## 2023-01-17 RX ORDER — AMOXICILLIN AND CLAVULANATE POTASSIUM 875; 125 MG/1; MG/1
1 TABLET, FILM COATED ORAL 2 TIMES DAILY
Qty: 14 TABLET | Refills: 0 | Status: SHIPPED | OUTPATIENT
Start: 2023-01-17

## 2023-01-17 RX ORDER — ACETAMINOPHEN, ASPIRIN (NSAID), AND CAFFEINE 250; 250; 65 MG/1; MG/1; MG/1
1 TABLET, FILM COATED ORAL
Qty: 30 TABLET | Refills: 1 | Status: SHIPPED | OUTPATIENT
Start: 2023-01-17

## 2023-01-17 NOTE — PROGRESS NOTES
ADVISED PATIENT OF THE FOLLOWING HEALTH MAINTAINCE DUE  Health Maintenance Due   Topic Date Due    Hepatitis C Screening  Never done    HPV Age 9Y-34Y (1 - 2-dose series) Never done    DTaP/Tdap/Td series (1 - Tdap) Never done    Pap Smear  Never done    COVID-19 Vaccine (4 - Booster for Pfizer series) 06/28/2022    Flu Vaccine (1) Never done      Chief Complaint   Patient presents with    Anxiety    GERD    Sinus Pain       1. \"Have you been to the ER, urgent care clinic since your last visit? Hospitalized since your last visit? \" No    2. \"Have you seen or consulted any other health care providers outside of the [address] [place] [address] since your last visit? \" No     3. For patients aged 39-70: Has the patient had a colonoscopy / FIT/ Cologuard? No      If the patient is female:    4. For patients aged 41-77: Has the patient had a mammogram within the past 2 years? No      5. For patients aged 21-65: Has the patient had a pap smear?  No

## 2023-01-17 NOTE — PROGRESS NOTES
HISTORY OF PRESENT ILLNESS  Margret Wheeler is a 33 y.o. female. Patient was seen for a follow up. Reports that her mental health is still increasing. Took the Wellbutrin, but reports that her dreams were becoming vivid. Continued to take the medication and has now run out. Prior to this had taken Prozac and this worked, but felt like it was effecting her libido. No thoughts on wanting to harm herself or others. Never picked up antibiotics for UTI. Reports continued symptoms and memory changes. Also reports a sinus like HA for years. No congestion, cough of fever. Was told in the past to get an eye exam. This was all stable. This sits behind the eye. Comes and goes. Does not worsen. Has taken OTC tylenol, but this does not help. Visit Vitals  /68 (BP 1 Location: Right arm, BP Patient Position: Sitting, BP Cuff Size: Small adult)   Pulse 84   Temp 97.8 °F (36.6 °C) (Oral)   Ht 5' 4\" (1.626 m)   Wt 126 lb (57.2 kg)   SpO2 99%   BMI 21.63 kg/m²   No past medical history on file. No past surgical history on file. Family History   Problem Relation Age of Onset    GERD Mother     Hypertension Mother     GERD Father      Outpatient Encounter Medications as of 1/17/2023   Medication Sig Dispense Refill    amoxicillin-clavulanate (AUGMENTIN) 875-125 mg per tablet Take 1 Tablet by mouth two (2) times a day. 14 Tablet 0    aspirin-acetaminophen-caffeine (Excedrin Extra Strength) 250-250-65 mg per tablet Take 1 Tablet by mouth every six (6) hours as needed for Headache. 30 Tablet 1    pantoprazole (PROTONIX) 20 mg tablet Take 1 Tablet by mouth daily. 90 Tablet 1    etonogestreL (Nexplanon) 68 mg impl 1 Each by SubDERmal route. [DISCONTINUED] amoxicillin-clavulanate (AUGMENTIN) 875-125 mg per tablet Take 1 Tablet by mouth two (2) times a day. 14 Tablet 0    [DISCONTINUED] buPROPion XL (WELLBUTRIN XL) 150 mg tablet Take 1 Tablet by mouth every morning.  30 Tablet 1     No facility-administered encounter medications on file as of 1/17/2023. HPI    Review of Systems   Constitutional: Negative. HENT:  Negative for congestion, sinus pain and sore throat. Eyes: Negative. Respiratory: Negative. Cardiovascular:  Negative for chest pain and palpitations. Gastrointestinal: Negative. Musculoskeletal: Negative. Neurological:  Positive for headaches. Negative for dizziness. Psychiatric/Behavioral:  Positive for depression. Negative for suicidal ideas. The patient is nervous/anxious. The patient does not have insomnia. Physical Exam  Vitals and nursing note reviewed. Constitutional:       General: She is not in acute distress. HENT:      Right Ear: Tympanic membrane normal.      Left Ear: Tympanic membrane normal.      Nose: Nose normal.   Eyes:      Pupils: Pupils are equal, round, and reactive to light. Cardiovascular:      Rate and Rhythm: Normal rate and regular rhythm. Pulmonary:      Effort: Pulmonary effort is normal.      Breath sounds: Normal breath sounds. Abdominal:      General: Bowel sounds are normal.      Palpations: Abdomen is soft. Tenderness: There is no right CVA tenderness or left CVA tenderness. Musculoskeletal:         General: Normal range of motion. Cervical back: Neck supple. Skin:     General: Skin is warm. Neurological:      Mental Status: She is alert and oriented to person, place, and time. Psychiatric:         Mood and Affect: Mood is anxious. Behavior: Behavior normal.       ASSESSMENT and PLAN  Diagnoses and all orders for this visit:    1. Urinary tract infection without hematuria, site unspecified  -     amoxicillin-clavulanate (AUGMENTIN) 875-125 mg per tablet; Take 1 Tablet by mouth two (2) times a day. 2. Emotional stress  -     REFERRAL TO PSYCHIATRY    3. Anxiety  -     REFERRAL TO PSYCHIATRY    4. Sinus headache  -     aspirin-acetaminophen-caffeine (Excedrin Extra Strength) 250-250-65 mg per tablet;  Take 1 Tablet by mouth every six (6) hours as needed for Headache.       Follow-up and Dispositions    Return if symptoms worsen or fail to improve.       lab results and schedule of future lab studies reviewed with patient  reviewed diet, exercise and weight control  reviewed medications and side effects in detail

## 2023-05-20 RX ORDER — ACETAMINOPHEN, ASPIRIN AND CAFFEINE 250; 250; 65 MG/1; MG/1; MG/1
1 TABLET, FILM COATED ORAL EVERY 6 HOURS PRN
COMMUNITY
Start: 2023-01-17

## 2023-05-20 RX ORDER — FLUTICASONE PROPIONATE 50 MCG
2 SPRAY, SUSPENSION (ML) NASAL DAILY
COMMUNITY
Start: 2023-04-11

## 2023-05-20 RX ORDER — PANTOPRAZOLE SODIUM 20 MG/1
20 TABLET, DELAYED RELEASE ORAL DAILY
COMMUNITY
Start: 2022-12-06

## 2023-05-20 RX ORDER — CETIRIZINE HYDROCHLORIDE 10 MG/1
10 TABLET ORAL DAILY
COMMUNITY
Start: 2023-04-11

## 2023-05-20 RX ORDER — ETONOGESTREL 68 MG/1
68 IMPLANT SUBCUTANEOUS
COMMUNITY

## 2023-09-15 DIAGNOSIS — H65.90 UNSPECIFIED NONSUPPURATIVE OTITIS MEDIA, UNSPECIFIED EAR: ICD-10-CM

## 2023-09-15 DIAGNOSIS — J00 ACUTE NASOPHARYNGITIS (COMMON COLD): ICD-10-CM

## 2023-09-15 RX ORDER — CETIRIZINE HYDROCHLORIDE 10 MG/1
10 TABLET ORAL DAILY
Qty: 30 TABLET | Refills: 1 | Status: SHIPPED | OUTPATIENT
Start: 2023-09-15

## 2024-01-29 RX ORDER — PANTOPRAZOLE SODIUM 20 MG/1
20 TABLET, DELAYED RELEASE ORAL DAILY
Qty: 30 TABLET | Refills: 8 | Status: SHIPPED | OUTPATIENT
Start: 2024-01-29

## 2024-01-30 NOTE — PROGRESS NOTES
Tired. Pt has area on her belly that hurts and won't go away. Feeling baby move. GBS today    Pt insisting on US - states she wants to see the baby and Dr. Hansa Hoyt did an [address] with her last baby to check on baby no medical reason at this time. no

## 2024-02-23 ENCOUNTER — TELEPHONE (OUTPATIENT)
Age: 29
End: 2024-02-23

## 2024-02-23 NOTE — TELEPHONE ENCOUNTER
Called 03:30 pm  Palpitation  Breathing fast  Feeling dizzy  For couple of mins. Started yesterday.  Advised to go to ER.  PATIENT INSISTED ON SCHEDULING AN APPOINTMENT  BP AT HOME 90/70  Patient doest have anyone to take care of her kids if she goes to the ER now. Advised again that she needs to be checked and evaluated soon.  Patient verbalized understanding and appreciation

## 2024-03-01 DIAGNOSIS — H65.90 UNSPECIFIED NONSUPPURATIVE OTITIS MEDIA, UNSPECIFIED EAR: ICD-10-CM

## 2024-03-01 DIAGNOSIS — J00 ACUTE NASOPHARYNGITIS (COMMON COLD): ICD-10-CM

## 2024-03-01 RX ORDER — CETIRIZINE HYDROCHLORIDE 10 MG/1
10 TABLET ORAL DAILY
Qty: 30 TABLET | Refills: 1 | Status: SHIPPED | OUTPATIENT
Start: 2024-03-01

## 2024-04-22 ENCOUNTER — TELEPHONE (OUTPATIENT)
Age: 29
End: 2024-04-22

## 2024-04-22 RX ORDER — IBUPROFEN 600 MG/1
600 TABLET ORAL EVERY 8 HOURS
Qty: 21 TABLET | Refills: 0 | Status: SHIPPED | OUTPATIENT
Start: 2024-04-22 | End: 2024-04-29

## 2024-04-22 NOTE — TELEPHONE ENCOUNTER
Patient called in, name and  verified. Patient is calling as she had her Nexplanon placed on 2022. She reports that she has not had any issues with it until recently. She has been spotting since 2024. She has been informed that some irregular breakthrough bleeding can be considered normal but please let me know what messages you would like relayed to your pt. She reports some spotting before but it usually only lasted a few days. It has lasted months now.    Triage notes: Pt would like a Jan [facility] message sent in response.

## 2024-07-27 ENCOUNTER — HOSPITAL ENCOUNTER (EMERGENCY)
Facility: HOSPITAL | Age: 29
Discharge: HOME | End: 2024-07-27
Attending: EMERGENCY MEDICINE
Payer: COMMERCIAL

## 2024-07-27 ENCOUNTER — APPOINTMENT (OUTPATIENT)
Facility: HOSPITAL | Age: 29
End: 2024-07-27
Payer: COMMERCIAL

## 2024-07-27 VITALS
HEART RATE: 70 BPM | TEMPERATURE: 98.1 F | WEIGHT: 139 LBS | BODY MASS INDEX: 23.73 KG/M2 | DIASTOLIC BLOOD PRESSURE: 71 MMHG | OXYGEN SATURATION: 99 % | HEIGHT: 64 IN | RESPIRATION RATE: 16 BRPM | SYSTOLIC BLOOD PRESSURE: 101 MMHG

## 2024-07-27 DIAGNOSIS — R10.13 DYSPEPSIA: Primary | ICD-10-CM

## 2024-07-27 DIAGNOSIS — R10.13 ABDOMINAL PAIN, EPIGASTRIC: ICD-10-CM

## 2024-07-27 LAB
ALBUMIN SERPL-MCNC: 4.4 G/DL (ref 3.5–5)
ALBUMIN/GLOB SERPL: 0.9 (ref 1.1–2.2)
ALP SERPL-CCNC: 58 U/L (ref 45–117)
ALT SERPL-CCNC: 24 U/L (ref 12–78)
ANION GAP SERPL CALC-SCNC: 7 MMOL/L (ref 5–15)
APPEARANCE UR: CLEAR
AST SERPL-CCNC: 16 U/L (ref 15–37)
BACTERIA URNS QL MICRO: NEGATIVE /HPF
BASOPHILS # BLD: 0 K/UL (ref 0–0.1)
BASOPHILS NFR BLD: 1 % (ref 0–1)
BILIRUB SERPL-MCNC: 0.4 MG/DL (ref 0.2–1)
BILIRUB UR QL: NEGATIVE
BUN SERPL-MCNC: 9 MG/DL (ref 6–20)
BUN/CREAT SERPL: 11 (ref 12–20)
CALCIUM SERPL-MCNC: 9.3 MG/DL (ref 8.5–10.1)
CHLORIDE SERPL-SCNC: 101 MMOL/L (ref 97–108)
CO2 SERPL-SCNC: 30 MMOL/L (ref 21–32)
COLOR UR: ABNORMAL
CREAT SERPL-MCNC: 0.83 MG/DL (ref 0.55–1.02)
DIFFERENTIAL METHOD BLD: NORMAL
EOSINOPHIL # BLD: 0.2 K/UL (ref 0–0.4)
EOSINOPHIL NFR BLD: 2 % (ref 0–7)
EPITH CASTS URNS QL MICRO: ABNORMAL /LPF
ERYTHROCYTE [DISTWIDTH] IN BLOOD BY AUTOMATED COUNT: 12.2 % (ref 11.5–14.5)
GLOBULIN SER CALC-MCNC: 4.7 G/DL (ref 2–4)
GLUCOSE SERPL-MCNC: 94 MG/DL (ref 65–100)
GLUCOSE UR STRIP.AUTO-MCNC: NEGATIVE MG/DL
HCG UR QL: NEGATIVE
HCT VFR BLD AUTO: 42 % (ref 35–47)
HGB BLD-MCNC: 13.8 G/DL (ref 11.5–16)
HGB UR QL STRIP: ABNORMAL
IMM GRANULOCYTES # BLD AUTO: 0 K/UL (ref 0–0.04)
IMM GRANULOCYTES NFR BLD AUTO: 0 % (ref 0–0.5)
KETONES UR QL STRIP.AUTO: NEGATIVE MG/DL
LEUKOCYTE ESTERASE UR QL STRIP.AUTO: NEGATIVE
LIPASE SERPL-CCNC: 36 U/L (ref 13–75)
LYMPHOCYTES # BLD: 1.7 K/UL (ref 0.8–3.5)
LYMPHOCYTES NFR BLD: 23 % (ref 12–49)
MCH RBC QN AUTO: 27.5 PG (ref 26–34)
MCHC RBC AUTO-ENTMCNC: 32.9 G/DL (ref 30–36.5)
MCV RBC AUTO: 83.7 FL (ref 80–99)
MONOCYTES # BLD: 0.6 K/UL (ref 0–1)
MONOCYTES NFR BLD: 7 % (ref 5–13)
NEUTS SEG # BLD: 4.9 K/UL (ref 1.8–8)
NEUTS SEG NFR BLD: 67 % (ref 32–75)
NITRITE UR QL STRIP.AUTO: NEGATIVE
NRBC # BLD: 0 K/UL (ref 0–0.01)
NRBC BLD-RTO: 0 PER 100 WBC
PH UR STRIP: 6 (ref 5–8)
PLATELET # BLD AUTO: 268 K/UL (ref 150–400)
PMV BLD AUTO: 10 FL (ref 8.9–12.9)
POTASSIUM SERPL-SCNC: 3.4 MMOL/L (ref 3.5–5.1)
PROT SERPL-MCNC: 9.1 G/DL (ref 6.4–8.2)
PROT UR STRIP-MCNC: NEGATIVE MG/DL
RBC # BLD AUTO: 5.02 M/UL (ref 3.8–5.2)
RBC #/AREA URNS HPF: ABNORMAL /HPF (ref 0–5)
SODIUM SERPL-SCNC: 138 MMOL/L (ref 136–145)
SP GR UR REFRACTOMETRY: 1.01 (ref 1–1.03)
SPECIMEN HOLD: NORMAL
UROBILINOGEN UR QL STRIP.AUTO: 0.2 EU/DL (ref 0.2–1)
WBC # BLD AUTO: 7.4 K/UL (ref 3.6–11)
WBC URNS QL MICRO: ABNORMAL /HPF (ref 0–4)

## 2024-07-27 PROCEDURE — 99284 EMERGENCY DEPT VISIT MOD MDM: CPT

## 2024-07-27 PROCEDURE — 76705 ECHO EXAM OF ABDOMEN: CPT

## 2024-07-27 PROCEDURE — 81025 URINE PREGNANCY TEST: CPT

## 2024-07-27 PROCEDURE — 36415 COLL VENOUS BLD VENIPUNCTURE: CPT

## 2024-07-27 PROCEDURE — 81001 URINALYSIS AUTO W/SCOPE: CPT

## 2024-07-27 PROCEDURE — 83690 ASSAY OF LIPASE: CPT

## 2024-07-27 PROCEDURE — 80053 COMPREHEN METABOLIC PANEL: CPT

## 2024-07-27 PROCEDURE — 85025 COMPLETE CBC W/AUTO DIFF WBC: CPT

## 2024-07-27 RX ORDER — PANTOPRAZOLE SODIUM 20 MG/1
20 TABLET, DELAYED RELEASE ORAL DAILY
Qty: 90 TABLET | Refills: 0 | Status: SHIPPED | OUTPATIENT
Start: 2024-07-27 | End: 2024-07-31 | Stop reason: SDUPTHER

## 2024-07-27 ASSESSMENT — LIFESTYLE VARIABLES
HOW MANY STANDARD DRINKS CONTAINING ALCOHOL DO YOU HAVE ON A TYPICAL DAY: PATIENT DOES NOT DRINK
HOW OFTEN DO YOU HAVE A DRINK CONTAINING ALCOHOL: NEVER

## 2024-07-27 ASSESSMENT — PAIN SCALES - GENERAL: PAINLEVEL_OUTOF10: 6

## 2024-07-27 ASSESSMENT — ENCOUNTER SYMPTOMS: ABDOMINAL PAIN: 1

## 2024-07-27 ASSESSMENT — PAIN DESCRIPTION - LOCATION: LOCATION: ABDOMEN

## 2024-07-27 ASSESSMENT — PAIN DESCRIPTION - ORIENTATION: ORIENTATION: MID

## 2024-07-27 ASSESSMENT — PAIN DESCRIPTION - DESCRIPTORS: DESCRIPTORS: BURNING

## 2024-07-27 NOTE — ED TRIAGE NOTES
Patient arrives with c/o fatigue, upper mid abdominal pain, \"acid reflux\", diarrhea and vomiting for a week. Reports hx of h.pylori and states this feels the same. Reports pain is worse anytime she eats anything. Patient has been taking pantoprazole, but ran out. Rates pain 6/10.

## 2024-07-27 NOTE — ED PROVIDER NOTES
[facility]  EMERGENCY DEPARTMENT ENCOUNTER      Pt Name: Sravanthi Don  MRN: [id number]  Birthdate 1995  Date of evaluation: 7/27/2024  Provider: Viridiana Don MD    CHIEF COMPLAINT       Chief Complaint   Patient presents with    Abdominal Pain    Diarrhea    Emesis         HISTORY OF PRESENT ILLNESS   (Location/Symptom, Timing/Onset, Context/Setting, Quality, Duration, Modifying Factors, Severity)  Note limiting factors.   The history is provided by the patient.   Abdominal Pain  Pain location:  Epigastric  Pain quality: aching    Pain radiates to:  Does not radiate  Pain severity:  Moderate  Duration:  1 week  Timing:  Intermittent  Chronicity:  New  Context: eating    Context: not suspicious food intake    Relieved by:  Nothing  Worsened by:  Eating        Review of External Medical Records:     Nursing Notes were reviewed.    REVIEW OF SYSTEMS    (2-9 systems for level 4, 10 or more for level 5)     Review of Systems   Gastrointestinal:  Positive for abdominal pain.       Except as noted above the remainder of the review of systems was reviewed and negative.       PAST MEDICAL HISTORY     Past Medical History:   Diagnosis Date    H. pylori infection          SURGICAL HISTORY     History reviewed. No pertinent surgical history.      CURRENT MEDICATIONS       Discharge Medication List as of 7/27/2024  5:44 PM        CONTINUE these medications which have NOT CHANGED    Details   ibuprofen (ADVIL;MOTRIN) 600 MG tablet Take 1 tablet by mouth every 8 (eight) hours for 21 doses, Disp-21 tablet, R-0Normal      cetirizine (ZYRTEC) 10 MG tablet TAKE 1 TABLET BY MOUTH EVERY DAY, Disp-30 tablet, R-1Normal      aspirin-acetaminophen-caffeine (EXCEDRIN MIGRAINE) 250-250-65 MG per tablet Take 1 tablet by mouth every 6 hours as neededHistorical Med      etonogestrel (NEXPLANON) 68 MG implant Inject 68 mg into the skinHistorical Med      fluticasone (FLONASE) 50 MCG/ACT nasal spray 2 sprays by Nasal route    Height:               Medical Decision Making  Amount and/or Complexity of Data Reviewed  Labs: ordered.  Radiology: ordered.    Risk  Prescription drug management.    The patient is resting comfortably and feels better, is alert and in no distress. The repeat examination is unremarkable and benign; in particular, there is no discomfort at McBurney's point. The history, exam, diagnostic testing, and current condition do not suggest acute appendicitis, bowel obstruction, incarcerated hernia, acute cholecystitis, bowel perforation, major gastrointestinal bleeding, severe diverticulitis, sepsis, or other significant pathology to warrant further testing, continued ED treatment, admission, or surgical evaluation at this point. The vital signs have been stable and are within normal limits at this time. The patient does not have uncontrollable pain, intractable vomiting, or other significant symptoms. The patient's condition is stable and appropriate for discharge. The patient will pursue further outpatient evaluation with the primary care physician or other designated or consulting physician as indicated in the discharge instructions.          REASSESSMENT            CONSULTS:  None    PROCEDURES:  Unless otherwise noted below, none     Procedures      FINAL IMPRESSION      1. Dyspepsia    2. Abdominal pain, epigastric          DISPOSITION/PLAN   DISPOSITION Decision To Discharge 07/27/2024 05:43:29 PM      PATIENT REFERRED TO:  Ava Perkins, APRN - NP  [address] [id number] [phone]    Schedule an appointment as soon as possible for a visit   As needed      DISCHARGE MEDICATIONS:  Discharge Medication List as of 7/27/2024  5:44 PM            (Please note that portions of this note were completed with a voice recognition program.  Efforts were made to edit the dictations but occasionally words are mis-transcribed.)    Viridiana Don MD (electronically signed)  Emergency Attending Physician

## 2024-07-31 ENCOUNTER — TELEMEDICINE (OUTPATIENT)
Age: 29
End: 2024-07-31
Payer: COMMERCIAL

## 2024-07-31 DIAGNOSIS — L65.9 HAIR THINNING: ICD-10-CM

## 2024-07-31 DIAGNOSIS — N92.6 IRREGULAR PERIODS: ICD-10-CM

## 2024-07-31 DIAGNOSIS — Z86.19 HISTORY OF HELICOBACTER PYLORI INFECTION: ICD-10-CM

## 2024-07-31 DIAGNOSIS — K80.20 GALLSTONES: Primary | ICD-10-CM

## 2024-07-31 PROCEDURE — 99443 PR PHYS/QHP TELEPHONE EVALUATION 21-30 MIN: CPT | Performed by: INTERNAL MEDICINE

## 2024-07-31 RX ORDER — PANTOPRAZOLE SODIUM 20 MG/1
40 TABLET, DELAYED RELEASE ORAL DAILY
Qty: 120 TABLET | Refills: 1 | Status: SHIPPED | OUTPATIENT
Start: 2024-07-31 | End: 2024-11-28

## 2024-07-31 SDOH — ECONOMIC STABILITY: INCOME INSECURITY: HOW HARD IS IT FOR YOU TO PAY FOR THE VERY BASICS LIKE FOOD, HOUSING, MEDICAL CARE, AND HEATING?: NOT HARD AT ALL

## 2024-07-31 SDOH — ECONOMIC STABILITY: INCOME INSECURITY: HOW HARD IS IT FOR YOU TO PAY FOR THE VERY BASICS LIKE FOOD, HOUSING, MEDICAL CARE, AND HEATING?: SOMEWHAT HARD

## 2024-07-31 SDOH — ECONOMIC STABILITY: HOUSING INSECURITY
IN THE LAST 12 MONTHS, WAS THERE A TIME WHEN YOU DID NOT HAVE A STEADY PLACE TO SLEEP OR SLEPT IN A SHELTER (INCLUDING NOW)?: NO

## 2024-07-31 SDOH — ECONOMIC STABILITY: FOOD INSECURITY: WITHIN THE PAST 12 MONTHS, YOU WORRIED THAT YOUR FOOD WOULD RUN OUT BEFORE YOU GOT MONEY TO BUY MORE.: NEVER TRUE

## 2024-07-31 SDOH — ECONOMIC STABILITY: FOOD INSECURITY: WITHIN THE PAST 12 MONTHS, YOU WORRIED THAT YOUR FOOD WOULD RUN OUT BEFORE YOU GOT MONEY TO BUY MORE.: SOMETIMES TRUE

## 2024-07-31 SDOH — ECONOMIC STABILITY: FOOD INSECURITY: WITHIN THE PAST 12 MONTHS, THE FOOD YOU BOUGHT JUST DIDN'T LAST AND YOU DIDN'T HAVE MONEY TO GET MORE.: NEVER TRUE

## 2024-07-31 SDOH — ECONOMIC STABILITY: FOOD INSECURITY: WITHIN THE PAST 12 MONTHS, THE FOOD YOU BOUGHT JUST DIDN'T LAST AND YOU DIDN'T HAVE MONEY TO GET MORE.: SOMETIMES TRUE

## 2024-07-31 ASSESSMENT — ENCOUNTER SYMPTOMS
RESPIRATORY NEGATIVE: 1
ABDOMINAL PAIN: 1

## 2024-07-31 ASSESSMENT — PATIENT HEALTH QUESTIONNAIRE - PHQ9
2. FEELING DOWN, DEPRESSED OR HOPELESS: NOT AT ALL
SUM OF ALL RESPONSES TO PHQ9 QUESTIONS 1 & 2: 0
SUM OF ALL RESPONSES TO PHQ QUESTIONS 1-9: 0
1. LITTLE INTEREST OR PLEASURE IN DOING THINGS: NOT AT ALL
SUM OF ALL RESPONSES TO PHQ QUESTIONS 1-9: 0

## 2024-07-31 NOTE — PROGRESS NOTES
2024    TELEHEALTH EVALUATION -- Audio/Visual    HPI:    Sravanthi Don (:  1995) has requested an audio/video evaluation for the following concern(s):      History of Present Illness  The patient is being seen virtually for a follow-up because it has been a little over a year since her last visit.    She recently sought emergency care due to a week-long episode of diarrhea, gas, acid reflux, and stomach pain, which was triggered by eating. An ultrasound revealed the presence of gallstones. Despite this diagnosis, she continues to experience stomach discomfort, similar to burning acid reflux. She was prescribed pantoprazole, but it has not been effective. She was advised to consult with a gastroenterologist but has been unable to secure an appointment. She also has a history of H. pylori infection, treated with antibiotics two years ago, and was instructed to have regular check-ups every three years. She suspects that her current symptoms may be related to this past infection.    She has been experiencing hair loss and thinning and is seeking a referral to a dermatologist.    She has a Nexplanon implant and has been experiencing prolonged bleeding episodes lasting 2 to 3 weeks for the past few months. A previous episode was managed with high-dose ibuprofen, which successfully stopped the bleeding. However, the bleeding has recently resumed, and she has been spotting for approximately 20 days.    She has been experiencing episodes of rapid heart rate, even at rest, and is uncertain if this is a new symptom.    She is taking escitalopram for her mental health, which is helping. She was started on 5 mg by Mirta Shelton, a psychiatrist, almost a month ago. After a follow-up, the dosage was increased to 10 mg, and another follow-up is scheduled in one month.    Review of Systems   Constitutional: Negative.    Respiratory: Negative.     Cardiovascular:  Positive for palpitations.   Gastrointestinal:  Positive

## 2024-07-31 NOTE — PROGRESS NOTES
Chief Complaint   Patient presents with    Medication Refill     \"Have you been to the ER, urgent care clinic since your last visit?  Hospitalized since your last visit?\"    Yes  07/27/2024  [facility]   Abdominal pain/ diarrhea /vomiting    “Have you seen or consulted any other health care providers outside of [facility] since your last visit?”    NO     “Have you had a pap smear?”    Yes  unknown    No cervical cancer screening on file             Click Here for Release of Records Request

## 2024-08-01 ENCOUNTER — TELEPHONE (OUTPATIENT)
Age: 29
End: 2024-08-01

## 2024-08-01 NOTE — TELEPHONE ENCOUNTER
Attempted to contact patient regarding referral for gallstones. Patient did not answer, LVM requesting a return call if interested in scheduling. Will follow up.

## 2024-08-14 ENCOUNTER — OFFICE VISIT (OUTPATIENT)
Age: 29
End: 2024-08-14
Payer: COMMERCIAL

## 2024-08-14 VITALS
OXYGEN SATURATION: 96 % | DIASTOLIC BLOOD PRESSURE: 57 MMHG | WEIGHT: 138 LBS | HEIGHT: 64 IN | BODY MASS INDEX: 23.56 KG/M2 | HEART RATE: 84 BPM | TEMPERATURE: 97.7 F | SYSTOLIC BLOOD PRESSURE: 98 MMHG | RESPIRATION RATE: 16 BRPM

## 2024-08-14 DIAGNOSIS — K80.20 SYMPTOMATIC CHOLELITHIASIS: Primary | ICD-10-CM

## 2024-08-14 DIAGNOSIS — R19.7 DIARRHEA, UNSPECIFIED TYPE: ICD-10-CM

## 2024-08-14 DIAGNOSIS — K21.9 GASTROESOPHAGEAL REFLUX DISEASE, UNSPECIFIED WHETHER ESOPHAGITIS PRESENT: ICD-10-CM

## 2024-08-14 PROCEDURE — 99204 OFFICE O/P NEW MOD 45 MIN: CPT | Performed by: SURGERY

## 2024-08-14 RX ORDER — DICYCLOMINE HCL 20 MG
20 TABLET ORAL
Qty: 90 TABLET | Refills: 3 | Status: SHIPPED | OUTPATIENT
Start: 2024-08-14 | End: 2024-12-12

## 2024-08-14 RX ORDER — ONDANSETRON 4 MG/1
4 TABLET, ORALLY DISINTEGRATING ORAL EVERY 6 HOURS PRN
Qty: 30 TABLET | Refills: 1 | Status: SHIPPED | OUTPATIENT
Start: 2024-08-14

## 2024-08-14 RX ORDER — ESCITALOPRAM OXALATE 10 MG/1
TABLET ORAL
COMMUNITY
Start: 2024-08-13

## 2024-08-14 ASSESSMENT — PATIENT HEALTH QUESTIONNAIRE - PHQ9
SUM OF ALL RESPONSES TO PHQ QUESTIONS 1-9: 0
2. FEELING DOWN, DEPRESSED OR HOPELESS: NOT AT ALL
SUM OF ALL RESPONSES TO PHQ9 QUESTIONS 1 & 2: 0
SUM OF ALL RESPONSES TO PHQ QUESTIONS 1-9: 0
SUM OF ALL RESPONSES TO PHQ QUESTIONS 1-9: 0
1. LITTLE INTEREST OR PLEASURE IN DOING THINGS: NOT AT ALL
SUM OF ALL RESPONSES TO PHQ QUESTIONS 1-9: 0

## 2024-08-14 NOTE — PROGRESS NOTES
Identified patient with two patient identifiers (name and ). Reviewed chart in preparation for visit and have obtained necessary documentation.    Sravanthi Don is a 28 y.o. female  Chief Complaint   Patient presents with    New Patient     Gallstones     BP (!) 98/57   Ht 1.626 m (5' 4\")   Wt 62.6 kg (138 lb)   BMI 23.69 kg/m²     1. Have you been to the ER, urgent care clinic since your last visit?  Hospitalized since your last visit?no    2. Have you seen or consulted any other health care providers outside of the [facility] since your last visit?  Include any pap smears or colon screening. no

## 2024-08-14 NOTE — PROGRESS NOTES
General Surgery Office Consultation / H & P    CC: Abdominal pain  History of Present Illness:      Sravanthi Don is a 28 y.o. female who presents with epigastric abdominal pain radiating to her back.  She reports the pain is most severe after she has most food but mainly beef.  She has avoided this.  Having nausea and vomiting as well.  I was seen in the ER and found to have a 4 mm gallstone.  Pain is mostly in the epigastrium without pain on the right side.  Pain is about a 8 or 9 out of 10.  She reports diarrhea within a few minutes of eating most foods.  She underwent an endoscopy in 2020 at [facility] with a positive H. pylori.  This was treated.  She takes PPI still.  No prior abdominal surgeries.  Here to discuss possible surgical options.  She is scheduled to see a GI specialist in December.    Past Medical History:   Diagnosis Date    H. pylori infection      History reviewed. No pertinent surgical history.   Family History   Problem Relation Age of Onset    GERD Father     Hypertension Mother     GERD Mother      Social History     Socioeconomic History    Marital status:      Spouse name: None    Number of children: None    Years of education: None    Highest education level: None   Tobacco Use    Smoking status: Never     Passive exposure: Never    Smokeless tobacco: Never   Vaping Use    Vaping status: Never Used   Substance and Sexual Activity    Alcohol use: Never    Drug use: Never     Social Determinants of Health     Financial Resource Strain: Low Risk  (7/31/2024)    Overall Financial Resource Strain (CARDIA)     Difficulty of Paying Living Expenses: Not hard at all   Transportation Needs: Unknown (7/31/2024)    PRAPARE - Transportation     Lack of Transportation (Non-Medical): No   Housing Stability: Unknown (7/31/2024)    Housing Stability Vital Sign     Unstable Housing in the Last Year: No      Prior to Admission medications    Medication Sig Start Date End Date Taking? Authorizing Provider

## 2024-08-26 ENCOUNTER — TELEPHONE (OUTPATIENT)
Age: 29
End: 2024-08-26

## 2024-09-25 ENCOUNTER — OFFICE VISIT (OUTPATIENT)
Age: 29
End: 2024-09-25
Payer: COMMERCIAL

## 2024-09-25 VITALS
BODY MASS INDEX: 22.83 KG/M2 | OXYGEN SATURATION: 100 % | HEART RATE: 71 BPM | SYSTOLIC BLOOD PRESSURE: 122 MMHG | RESPIRATION RATE: 16 BRPM | DIASTOLIC BLOOD PRESSURE: 80 MMHG | WEIGHT: 133 LBS

## 2024-09-25 DIAGNOSIS — R25.2 MUSCLE CRAMP: ICD-10-CM

## 2024-09-25 DIAGNOSIS — Z86.19 HISTORY OF HELICOBACTER PYLORI INFECTION: ICD-10-CM

## 2024-09-25 DIAGNOSIS — F41.9 ANXIETY DISORDER, UNSPECIFIED TYPE: ICD-10-CM

## 2024-09-25 DIAGNOSIS — E55.9 VITAMIN D DEFICIENCY: ICD-10-CM

## 2024-09-25 DIAGNOSIS — N89.8 VAGINAL IRRITATION: ICD-10-CM

## 2024-09-25 DIAGNOSIS — R41.3 MEMORY CHANGE: ICD-10-CM

## 2024-09-25 DIAGNOSIS — R68.89 FORGETFULNESS: ICD-10-CM

## 2024-09-25 DIAGNOSIS — R41.3 MEMORY CHANGE: Primary | ICD-10-CM

## 2024-09-25 DIAGNOSIS — K80.20 GALLSTONES: ICD-10-CM

## 2024-09-25 DIAGNOSIS — R30.0 DYSURIA: ICD-10-CM

## 2024-09-25 LAB
BASOPHILS # BLD AUTO: 0.1 X10E3/UL (ref 0–0.2)
BASOPHILS NFR BLD AUTO: 1 %
EOSINOPHIL # BLD AUTO: 0.1 X10E3/UL (ref 0–0.4)
EOSINOPHIL NFR BLD AUTO: 2 %
ERYTHROCYTE [DISTWIDTH] IN BLOOD BY AUTOMATED COUNT: 12.3 % (ref 11.7–15.4)
HCT VFR BLD AUTO: 38.7 % (ref 34–46.6)
HGB BLD-MCNC: 12.3 G/DL (ref 11.1–15.9)
IMM GRANULOCYTES # BLD AUTO: 0 X10E3/UL (ref 0–0.1)
IMM GRANULOCYTES NFR BLD AUTO: 0 %
LYMPHOCYTES # BLD AUTO: 1.6 X10E3/UL (ref 0.7–3.1)
LYMPHOCYTES NFR BLD AUTO: 26 %
MCH RBC QN AUTO: 27.6 PG (ref 26.6–33)
MCHC RBC AUTO-ENTMCNC: 31.8 G/DL (ref 31.5–35.7)
MCV RBC AUTO: 87 FL (ref 79–97)
MONOCYTES # BLD AUTO: 0.5 X10E3/UL (ref 0.1–0.9)
MONOCYTES NFR BLD AUTO: 8 %
NEUTROPHILS # BLD AUTO: 3.9 X10E3/UL (ref 1.4–7)
NEUTROPHILS NFR BLD AUTO: 63 %
PLATELET # BLD AUTO: 272 X10E3/UL (ref 150–450)
RBC # BLD AUTO: 4.46 X10E6/UL (ref 3.77–5.28)
WBC # BLD AUTO: 6.2 X10E3/UL (ref 3.4–10.8)

## 2024-09-25 PROCEDURE — 99214 OFFICE O/P EST MOD 30 MIN: CPT | Performed by: INTERNAL MEDICINE

## 2024-09-25 RX ORDER — HYDROXYZINE HYDROCHLORIDE 10 MG/1
TABLET, FILM COATED ORAL
COMMUNITY
Start: 2024-09-17

## 2024-09-25 RX ORDER — PANTOPRAZOLE SODIUM 20 MG/1
40 TABLET, DELAYED RELEASE ORAL DAILY
Qty: 90 TABLET | Refills: 1 | Status: SHIPPED | OUTPATIENT
Start: 2024-09-25 | End: 2024-12-24

## 2024-09-25 ASSESSMENT — PATIENT HEALTH QUESTIONNAIRE - PHQ9
SUM OF ALL RESPONSES TO PHQ QUESTIONS 1-9: 11
7. TROUBLE CONCENTRATING ON THINGS, SUCH AS READING THE NEWSPAPER OR WATCHING TELEVISION: NEARLY EVERY DAY
2. FEELING DOWN, DEPRESSED OR HOPELESS: NEARLY EVERY DAY
8. MOVING OR SPEAKING SO SLOWLY THAT OTHER PEOPLE COULD HAVE NOTICED. OR THE OPPOSITE, BEING SO FIGETY OR RESTLESS THAT YOU HAVE BEEN MOVING AROUND A LOT MORE THAN USUAL: NOT AT ALL
1. LITTLE INTEREST OR PLEASURE IN DOING THINGS: NEARLY EVERY DAY
SUM OF ALL RESPONSES TO PHQ QUESTIONS 1-9: 11
10. IF YOU CHECKED OFF ANY PROBLEMS, HOW DIFFICULT HAVE THESE PROBLEMS MADE IT FOR YOU TO DO YOUR WORK, TAKE CARE OF THINGS AT HOME, OR GET ALONG WITH OTHER PEOPLE: NOT DIFFICULT AT ALL
SUM OF ALL RESPONSES TO PHQ QUESTIONS 1-9: 11
9. THOUGHTS THAT YOU WOULD BE BETTER OFF DEAD, OR OF HURTING YOURSELF: NOT AT ALL
3. TROUBLE FALLING OR STAYING ASLEEP: SEVERAL DAYS
4. FEELING TIRED OR HAVING LITTLE ENERGY: SEVERAL DAYS
6. FEELING BAD ABOUT YOURSELF - OR THAT YOU ARE A FAILURE OR HAVE LET YOURSELF OR YOUR FAMILY DOWN: NOT AT ALL
SUM OF ALL RESPONSES TO PHQ9 QUESTIONS 1 & 2: 6
5. POOR APPETITE OR OVEREATING: NOT AT ALL
SUM OF ALL RESPONSES TO PHQ QUESTIONS 1-9: 11

## 2024-09-25 ASSESSMENT — ENCOUNTER SYMPTOMS
COLOR CHANGE: 1
RESPIRATORY NEGATIVE: 1

## 2024-09-26 LAB
25(OH)D3+25(OH)D2 SERPL-MCNC: 26.8 NG/ML (ref 30–100)
ALBUMIN SERPL-MCNC: 4.9 G/DL (ref 4–5)
ALP SERPL-CCNC: 60 IU/L (ref 44–121)
ALT SERPL-CCNC: 14 IU/L (ref 0–32)
APPEARANCE UR: ABNORMAL
AST SERPL-CCNC: 19 IU/L (ref 0–40)
BILIRUB SERPL-MCNC: 0.8 MG/DL (ref 0–1.2)
BILIRUB UR QL STRIP: NEGATIVE
BUN SERPL-MCNC: 11 MG/DL (ref 6–20)
BUN/CREAT SERPL: 15 (ref 9–23)
CALCIUM SERPL-MCNC: 9.6 MG/DL (ref 8.7–10.2)
CHLORIDE SERPL-SCNC: 102 MMOL/L (ref 96–106)
CO2 SERPL-SCNC: 23 MMOL/L (ref 20–29)
COLOR UR: YELLOW
CREAT SERPL-MCNC: 0.72 MG/DL (ref 0.57–1)
EGFRCR SERPLBLD CKD-EPI 2021: 117 ML/MIN/1.73
GLOBULIN SER CALC-MCNC: 3.4 G/DL (ref 1.5–4.5)
GLUCOSE SERPL-MCNC: 80 MG/DL (ref 70–99)
GLUCOSE UR QL STRIP: NEGATIVE
HGB UR QL STRIP: NEGATIVE
KETONES UR QL STRIP: NEGATIVE
LEUKOCYTE ESTERASE UR QL STRIP: ABNORMAL
MAGNESIUM SERPL-MCNC: 2.1 MG/DL (ref 1.6–2.3)
NITRITE UR QL STRIP: NEGATIVE
PH UR STRIP: 6 [PH] (ref 5–7.5)
POTASSIUM SERPL-SCNC: 3.7 MMOL/L (ref 3.5–5.2)
PROT SERPL-MCNC: 8.3 G/DL (ref 6–8.5)
PROT UR QL STRIP: ABNORMAL
SODIUM SERPL-SCNC: 139 MMOL/L (ref 134–144)
SP GR UR STRIP: 1.03 (ref 1–1.03)
TSH SERPL DL<=0.005 MIU/L-ACNC: 1.52 UIU/ML (ref 0.45–4.5)
UROBILINOGEN UR STRIP-MCNC: 0.2 MG/DL (ref 0.2–1)
VIT B12 SERPL-MCNC: 906 PG/ML (ref 232–1245)

## 2024-09-26 RX ORDER — POLYETHYLENE GLYCOL 3350, SODIUM SULFATE ANHYDROUS, SODIUM BICARBONATE, SODIUM CHLORIDE, POTASSIUM CHLORIDE 236; 22.74; 6.74; 5.86; 2.97 G/4L; G/4L; G/4L; G/4L; G/4L
4 POWDER, FOR SOLUTION ORAL ONCE
Qty: 4000 ML | Refills: 0 | Status: SHIPPED | OUTPATIENT
Start: 2024-09-26 | End: 2024-09-26

## 2024-10-11 ENCOUNTER — OFFICE VISIT (OUTPATIENT)
Age: 29
End: 2024-10-11

## 2024-10-11 VITALS
WEIGHT: 140.4 LBS | RESPIRATION RATE: 16 BRPM | DIASTOLIC BLOOD PRESSURE: 60 MMHG | HEART RATE: 63 BPM | OXYGEN SATURATION: 95 % | HEIGHT: 64 IN | BODY MASS INDEX: 23.97 KG/M2 | TEMPERATURE: 98 F | SYSTOLIC BLOOD PRESSURE: 102 MMHG

## 2024-10-11 DIAGNOSIS — Z30.46 ENCOUNTER FOR NEXPLANON REMOVAL: Primary | ICD-10-CM

## 2024-10-11 NOTE — PROGRESS NOTES
Sravanthi Don is a 28 y.o. female presents for a problem visit. She has abnormal bleeding with Nexplanon and would like it removed.    Chief Complaint   Patient presents with    Procedure       Problems: Abnormal Bleeding       No LMP recorded (lmp unknown).    Birth Control: Nexplanon    Last Pap: several years ago        1. Have you been to the ER, urgent care clinic, or hospitalized since your last visit? Yes    2. Have you seen or consulted any other health care providers outside of the [facility] since your last visit? No        Chart reviewed for the following:   Buzz YAP LPN, have reviewed the History, Physical and updated the Allergic reactions for Sravanthi Don     TIME OUT performed immediately prior to start of procedure:   Buzz YAP LPN, have performed the following reviews on Sravanthi Don prior to the start of the procedure:            * Patient was identified by name and date of birth   * Agreement on procedure being performed was verified  * Risks and Benefits explained to the patient  * Procedure site verified and marked as necessary  * Patient was positioned for comfort  * Consent was signed and verified     Time: 11:40    Date of procedure: 10/11/2024    Procedure performed by:  TRISTIAN Franco CNM       Provider assisted by: BRITTANY Root LPN    Patient assisted by: self    How tolerated by patient: tolerated the procedure well with no complications    Post Procedural Pain Scale: 0 - No Hurt    Comments: none    Examination chaperoned by Buzz Root LPN.  
dry for 24 hours and to remove the Steristrip in 5-7 days.     TIRSTIAN Franco CNM

## 2024-11-22 ENCOUNTER — ANESTHESIA (OUTPATIENT)
Facility: HOSPITAL | Age: 29
End: 2024-11-22
Payer: COMMERCIAL

## 2024-11-22 ENCOUNTER — HOSPITAL ENCOUNTER (OUTPATIENT)
Facility: HOSPITAL | Age: 29
Setting detail: OUTPATIENT SURGERY
Discharge: HOME | End: 2024-11-22
Attending: SURGERY | Admitting: SURGERY
Payer: COMMERCIAL

## 2024-11-22 ENCOUNTER — ANESTHESIA EVENT (OUTPATIENT)
Facility: HOSPITAL | Age: 29
End: 2024-11-22
Payer: COMMERCIAL

## 2024-11-22 VITALS
RESPIRATION RATE: 16 BRPM | DIASTOLIC BLOOD PRESSURE: 80 MMHG | SYSTOLIC BLOOD PRESSURE: 107 MMHG | WEIGHT: 140 LBS | HEART RATE: 55 BPM | TEMPERATURE: 97.6 F | BODY MASS INDEX: 23.9 KG/M2 | HEIGHT: 64 IN | OXYGEN SATURATION: 100 %

## 2024-11-22 LAB — HCG UR QL: NEGATIVE

## 2024-11-22 PROCEDURE — 3700000001 HC ADD 15 MINUTES (ANESTHESIA): Performed by: SURGERY

## 2024-11-22 PROCEDURE — 88305 TISSUE EXAM BY PATHOLOGIST: CPT

## 2024-11-22 PROCEDURE — 7100000001 HC PACU RECOVERY - ADDTL 15 MIN: Performed by: SURGERY

## 2024-11-22 PROCEDURE — 81025 URINE PREGNANCY TEST: CPT

## 2024-11-22 PROCEDURE — 7100000000 HC PACU RECOVERY - FIRST 15 MIN: Performed by: SURGERY

## 2024-11-22 PROCEDURE — 3700000000 HC ANESTHESIA ATTENDED CARE: Performed by: SURGERY

## 2024-11-22 PROCEDURE — 43239 EGD BIOPSY SINGLE/MULTIPLE: CPT | Performed by: SURGERY

## 2024-11-22 PROCEDURE — 3600000002 HC SURGERY LEVEL 2 BASE: Performed by: SURGERY

## 2024-11-22 PROCEDURE — 6360000002 HC RX W HCPCS: Performed by: NURSE ANESTHETIST, CERTIFIED REGISTERED

## 2024-11-22 PROCEDURE — 45380 COLONOSCOPY AND BIOPSY: CPT | Performed by: SURGERY

## 2024-11-22 PROCEDURE — 2709999900 HC NON-CHARGEABLE SUPPLY: Performed by: SURGERY

## 2024-11-22 PROCEDURE — 3600000012 HC SURGERY LEVEL 2 ADDTL 15MIN: Performed by: SURGERY

## 2024-11-22 RX ORDER — LIDOCAINE HYDROCHLORIDE 20 MG/ML
INJECTION, SOLUTION EPIDURAL; INFILTRATION; INTRACAUDAL; PERINEURAL
Status: DISCONTINUED | OUTPATIENT
Start: 2024-11-22 | End: 2024-11-22 | Stop reason: SDUPTHER

## 2024-11-22 RX ADMIN — PROPOFOL 150 MCG/KG/MIN: 10 INJECTION, EMULSION INTRAVENOUS at 08:22

## 2024-11-22 RX ADMIN — PROPOFOL 20 MG: 10 INJECTION, EMULSION INTRAVENOUS at 08:33

## 2024-11-22 RX ADMIN — LIDOCAINE HYDROCHLORIDE 50 MG: 20 INJECTION, SOLUTION EPIDURAL; INFILTRATION; INTRACAUDAL; PERINEURAL at 08:21

## 2024-11-22 RX ADMIN — PROPOFOL 150 MG: 10 INJECTION, EMULSION INTRAVENOUS at 08:23

## 2024-11-22 ASSESSMENT — PAIN SCALES - GENERAL
PAINLEVEL_OUTOF10: 0
PAINLEVEL_OUTOF10: 0

## 2024-11-22 NOTE — OP NOTE
Colonoscopy and EGD Procedure Note      Indications:    Abdominal pain, generalized, Diarrhea     :  LAKSHMI ARCE JR, MD    Staff: Circulator: Ольга Ayoub RN  Scrub Person First: Simón Sandoval RN  Circulator Assist: Laverne Faria RN     Implants: none    Referring Provider: Ava Perkins, APRN - NP    Sedation: MAC    Procedure Details:  After informed consent was obtained with all risks and benefits of procedure explained and preoperative exam completed, the patient was taken to the endoscopy suite and placed in the left lateral decubitus position.  Upon sequential sedation as per above, a digital rectal exam was performed  And was normal.  The Olympus videocolonoscope  was inserted in the rectum and carefully advanced to the terminal ileum.  The quality of preparation was excellent.  The colonoscope was slowly withdrawn with careful evaluation between folds. Retroflexion in the rectum was performed and was normal..     Colon Findings:   Rectum: normal  Sigmoid: normal  Descending Colon: normal  Transverse Colon: normal  Ascending Colon: normal  Cecum: normal  Terminal Ileum: normal      Following sequential administration of sedation as per above, the [id number] gastroscope was inserted into the mouth and advanced under direct vision to second portion of the duodenum.  A careful inspection was made as the gastroscope was withdrawn, including a retroflexed view of the proximal stomach; findings and interventions are described below.      EGD Findings:  Esophagus:normal  Stomach:normal   Duodenum/jejunum:normal      Therapies:  biopsy of esophagus  biopsy of stomach antrum  biopsy of colon terminal ileum and random colon  biopsy of duodenal proximal bulb    Complications:   None; patient tolerated the procedure well.           Impression:    See Postoperative diagnosis above    Recommendations:  -follow up with Dr. Floyd  Repeat colonoscopy at 47 y/o   Resume normal

## 2024-11-22 NOTE — ANESTHESIA POSTPROCEDURE EVALUATION
Department of Anesthesiology  Postprocedure Note    Patient: Sravanthi Don  MRN: [id number]  YOB: 1995  Date of evaluation: 11/22/2024    Procedure Summary       Date: 11/22/24 Room / Location: [facility] ASU A1 / [facility] AMBULATORY OR    Anesthesia Start: 0817 Anesthesia Stop: 0842    Procedures:       ESOPHAGOGASTRODUODENOSCOPY (Upper GI Region)      COLONOSCOPY (Lower GI Region) Diagnosis:       Diarrhea, unspecified type      Gastroesophageal reflux disease, unspecified whether esophagitis present      Irritable bowel syndrome with diarrhea      (Diarrhea, unspecified type [R19.7])      (Gastroesophageal reflux disease, unspecified whether esophagitis present [K21.9])      (Irritable bowel syndrome with diarrhea [K58.0])    Surgeons: Mar Wheeler Jr., MD Responsible Provider: Jt Schrader MD    Anesthesia Type: MAC ASA Status: 2            Anesthesia Type: No value filed.    Keyon Phase I: Keyon Score: 10    Keyon Phase II:      Anesthesia Post Evaluation    Patient location during evaluation: PACU  Patient participation: complete - patient participated  Level of consciousness: awake  Pain score: 0  Airway patency: patent  Nausea & Vomiting: no nausea  Cardiovascular status: blood pressure returned to baseline and hemodynamically stable  Respiratory status: acceptable  Hydration status: stable  Pain management: adequate and satisfactory to patient    No notable events documented.

## 2024-11-22 NOTE — H&P
General Surgery Office Consultation / H & P    CC: Abdominal pain  History of Present Illness:      Sravanthi Don is a 28 y.o. female who presents with epigastric abdominal pain radiating to her back.  She reports the pain is most severe after she has most food but mainly beef.  She has avoided this.  Having nausea and vomiting as well.  I was seen in the ER and found to have a 4 mm gallstone.  Pain is mostly in the epigastrium without pain on the right side.  Pain is about a 8 or 9 out of 10.  She reports diarrhea within a few minutes of eating most foods.  She underwent an endoscopy in 2020 at [facility] with a positive H. pylori.  This was treated.  She takes PPI still.  No prior abdominal surgeries.  Here to discuss possible surgical options.  She is scheduled to see a GI specialist in December.    Past Medical History:   Diagnosis Date    H. pylori infection      History reviewed. No pertinent surgical history.   Family History   Problem Relation Age of Onset    GERD Father     Hypertension Mother     GERD Mother      Social History     Socioeconomic History    Marital status:      Spouse name: None    Number of children: None    Years of education: None    Highest education level: None   Tobacco Use    Smoking status: Never     Passive exposure: Never    Smokeless tobacco: Never   Vaping Use    Vaping status: Never Used   Substance and Sexual Activity    Alcohol use: Never    Drug use: Never     Social Determinants of Health     Financial Resource Strain: Low Risk  (7/31/2024)    Overall Financial Resource Strain (CARDIA)     Difficulty of Paying Living Expenses: Not hard at all   Transportation Needs: Unknown (7/31/2024)    PRAPARE - Transportation     Lack of Transportation (Non-Medical): No   Housing Stability: Unknown (7/31/2024)    Housing Stability Vital Sign     Unstable Housing in the Last Year: No      Prior to Admission medications    Medication Sig Start Date End Date Taking? Authorizing Provider  affect    Assessment:     28-year-old female with gallstones as well as epigastric pain, GERD, and diarrhea    Plan:     I am not convinced this is isolated only to her gallbladder.  We discussed a laparoscopic cholecystectomy and what that means in terms of the procedure and risks of bleeding, infection, biliary tree injury, leak, risk of anesthesia, and the risk of postprocedural diarrhea.  She may be dealing with more of an IBS picture.  I will try some Bentyl and give her some Zofran for symptoms.  Recommend a low-fat, low grease diet.  Continue PPI.  I will have my partner perform an upper and lower endoscopy on her to evaluate for GERD, recurrent H. pylori, or other colonic etiologies for her diarrhea.  Also agree with GI evaluation when she can get in.  Will hold off on surgery for now for her gallbladder.    Signed By: Mango Floyd MD, FACS, [facility]  Bariatric and General Surgeon  [facility]    August 14, 2024

## 2024-11-22 NOTE — ANESTHESIA PRE PROCEDURE
Allergies:  No Known Allergies    Problem List:    Patient Active Problem List   Diagnosis Code   • Normal labor O80, Z37.9   • 8 weeks gestation of pregnancy Z3A.08   • Pregnancy Z34.90   • History of Helicobacter pylori infection Z86.19   • Heartburn R12       Past Medical History:        Diagnosis Date   • H. pylori infection        Past Surgical History:  History reviewed. No pertinent surgical history.    Social History:    Social History     Tobacco Use   • Smoking status: Never     Passive exposure: Never   • Smokeless tobacco: Never   Substance Use Topics   • Alcohol use: Never                                Counseling given: Not Answered      Vital Signs (Current): There were no vitals filed for this visit.                                           BP Readings from Last 3 Encounters:   10/11/24 102/60   09/25/24 122/80   08/14/24 (!) 98/57       NPO Status:                                                                                 BMI:   Wt Readings from Last 3 Encounters:   10/11/24 63.7 kg (140 lb 6.4 oz)   09/25/24 60.3 kg (133 lb)   08/14/24 62.6 kg (138 lb)     There is no height or weight on file to calculate BMI.    CBC:   Lab Results   Component Value Date/Time    WBC 6.2 09/25/2024 10:34 AM    RBC 4.46 09/25/2024 10:34 AM    HGB 12.3 09/25/2024 10:34 AM    HCT 38.7 09/25/2024 10:34 AM    MCV 87 09/25/2024 10:34 AM    RDW 12.3 09/25/2024 10:34 AM     09/25/2024 10:34 AM       CMP:   Lab Results   Component Value Date/Time     09/25/2024 10:34 AM    K 3.7 09/25/2024 10:34 AM     09/25/2024 10:34 AM    CO2 23 09/25/2024 10:34 AM    BUN 11 09/25/2024 10:34 AM    CREATININE 0.72 09/25/2024 10:34 AM    AGRATIO 1.4 11/09/2022 03:29 PM    LABGLOM 117 09/25/2024 10:34 AM    LABGLOM 131 11/09/2022 03:29 PM    GLUCOSE 80 09/25/2024 10:34 AM    CALCIUM 9.6 09/25/2024 10:34 AM    BILITOT 0.8 09/25/2024 10:34 AM    ALKPHOS 60 09/25/2024 10:34 AM    AST 19 09/25/2024 10:34 AM    ALT

## 2024-11-22 NOTE — DISCHARGE INSTRUCTIONS
Sravanthi Don  [id number]  1995    EGD and COLONOSCOPY DISCHARGE INSTRUCTIONS    DISCOMFORT:  Redness at IV site- apply warm compress to area; if redness or soreness persist- contact your physician  Sore throat- throat lozenges or warm salt water gargle  There may be a slight amount of blood passed from the rectum  Gaseous discomfort- walking, belching will help relieve any discomfort    DIET:   Regular diet, however, remember your colon is empty and a heavy meal will produce gas.  Avoid these foods:  vegetables, fried / greasy foods, carbonated drinks for today  You may not drink alcoholic beverages for at least 12 hours       ACTIVITY:  You may not operate a vehicle for 12 hours  You may not engage in an occupation involving machinery or appliances for rest of today  You may then resume your normal daily activities it is recommended that you spend the remainder of the day resting -  avoid any strenuous activity.  Avoid making any critical decisions for at least 24 hour    CALL M.D.  ANY SIGN OF:   Increasing pain, nausea, vomiting  Abdominal distension (swelling)  New increased bleeding (oral or rectal)  Fever (chills)  Pain in chest area  Bloody discharge from nose or mouth  Shortness of breath     Follow-up Instructions:   Call Dr. Wheeler for any questions or problems.   Telephone # [phone]  Biopsy results will be available in  5 to 7 days      Impression: normal upper and lower endoscopy.  Biopsies taken no obvious source of her symptoms.  Follow up with Dr. Floyd.

## 2024-11-27 ENCOUNTER — TELEPHONE (OUTPATIENT)
Age: 29
End: 2024-11-27

## 2024-11-27 NOTE — TELEPHONE ENCOUNTER
----- Message from Dr. Mar Wheeler MD sent at 11/27/2024  1:39 PM EST -----  Biopsies all benign and not concerning.  Repeat colon at age 46,   follow up as scheudled  ----- Message -----  From: Kisha Booker Incoming Lab For Copath Pdfs  Sent: 11/25/2024  10:27 AM EST  To: Mar Wheeler Jr., MD

## 2025-04-04 DIAGNOSIS — H65.90 UNSPECIFIED NONSUPPURATIVE OTITIS MEDIA, UNSPECIFIED EAR: ICD-10-CM

## 2025-04-04 DIAGNOSIS — J00 ACUTE NASOPHARYNGITIS (COMMON COLD): ICD-10-CM

## 2025-04-04 RX ORDER — CETIRIZINE HYDROCHLORIDE 10 MG/1
10 TABLET ORAL DAILY
Qty: 30 TABLET | Refills: 1 | Status: SHIPPED | OUTPATIENT
Start: 2025-04-04

## 2025-05-12 RX ORDER — DICYCLOMINE HCL 20 MG
20 TABLET ORAL
Qty: 90 TABLET | Refills: 3 | OUTPATIENT
Start: 2025-05-12 | End: 2025-09-09

## (undated) DEVICE — COLON KIT WITH 1.1 OZ ORCA HYDRA SEAL 2 GOWN

## (undated) DEVICE — FORCEPS BX L240CM JAW DIA2.4MM ORNG L CAP W/ NDL DISP RAD

## (undated) DEVICE — UNDERPAD INCONT 36X23 IN POLYESTER COTTON SLEEPDRI

## (undated) DEVICE — BITE BLOCK ENDOSCP AD 60 FR W/ ADJ STRP PLAS GRN BLOX

## (undated) DEVICE — STRAP,POSITIONING,KNEE/BODY,FOAM,4X60": Brand: MEDLINE